# Patient Record
Sex: FEMALE | Race: WHITE | Employment: UNEMPLOYED | URBAN - METROPOLITAN AREA
[De-identification: names, ages, dates, MRNs, and addresses within clinical notes are randomized per-mention and may not be internally consistent; named-entity substitution may affect disease eponyms.]

---

## 2017-04-03 ENCOUNTER — TELEPHONE (OUTPATIENT)
Dept: OBGYN CLINIC | Age: 37
End: 2017-04-03

## 2017-04-03 NOTE — TELEPHONE ENCOUNTER
Patient calling stating that she was a previous patient of  and was prescribed 20 mg Lexapro and notices that she is having mood changes and feels like post partum depression and would like to have her rx increased. Patient advised that she needs to make an appointment to have it evaluated and she also needs to register with our office as a patient of our practice. Patient registered and scheduled appointment or 04/06/17 and will be requesting records to be transferred. Patient given address of [facility] office and advised to arrive 15 min early to complete paperwork and bring ID's, insurance card and any co-pays. Patient appreciative of the help.

## 2017-04-06 ENCOUNTER — OFFICE VISIT (OUTPATIENT)
Dept: OBGYN CLINIC | Age: 37
End: 2017-04-06

## 2017-04-06 VITALS — SYSTOLIC BLOOD PRESSURE: 116 MMHG | BODY MASS INDEX: 22.32 KG/M2 | DIASTOLIC BLOOD PRESSURE: 72 MMHG | WEIGHT: 160 LBS

## 2017-04-06 DIAGNOSIS — F41.9 ANXIETY: Primary | ICD-10-CM

## 2017-04-06 DIAGNOSIS — N94.3 PMS (PREMENSTRUAL SYNDROME): ICD-10-CM

## 2017-04-06 RX ORDER — NORETHINDRONE ACETATE AND ETHINYL ESTRADIOL 1MG-20(21)
1 KIT ORAL DAILY
Qty: 1 DOSE PACK | Refills: 11 | Status: SHIPPED | OUTPATIENT
Start: 2017-04-06 | End: 2020-10-30

## 2017-04-06 NOTE — PROGRESS NOTES
Depression note    Manjeet South is a 29 y.o. female and presents with Medication Evaluation  . Patient is here with complaints of depression    Her symptoms include depressed mood and anxiety. She complains of anxiety , currently on Lexapro. 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 This patient feels that she is able to care for herself. Risk factors for depression: she has always had underlying anxiey.     Review of Systems - History obtained from the patient-negative for:  Constitutional: weight loss, fever, night sweats  HEENT: hearing loss, earache, congestion, snoring, sorethroat  CV: chest pain, palpitations, edema  Resp: cough, shortness of breath, wheezing  Breast: breast lumps, nipple discharge, galactorrhea  GI: change in bowel habits, abdominal pain, black or bloody stools  : frequency, dysuria, hematuria, vaginal discharge  MSK: back pain, joint pain, muscle pain  Skin: itching, rash, hives  Neuro: dizziness, headache, confusion, weakness  Psych: anxiety, depression, change in mood  Heme/lymph: bleeding, bruising, pallor    Past Medical History:   Diagnosis Date    Anxiety     Psychiatric problem     anixety     Past Surgical History:   Procedure Laterality Date    HX DILATION AND CURETTAGE  12/2012    HX ORTHOPAEDIC  1999 &2001    Bunionect bilat    HX OTHER SURGICAL      wisdom teeth, age 13    HX OTHER SURGICAL      bunionectomy, 2001,2004    HX OTHER SURGICAL      d&c 2011     Social History     Social History    Marital status:      Spouse name: N/A    Number of children: N/A    Years of education: N/A     Social History Main Topics    Smoking status: Never Smoker    Smokeless tobacco: Never Used    Alcohol use 0.5 oz/week     1 Glasses of wine per week      Comment: occasional wine during pregnancy    Drug use: Yes     Special: OTC    Sexual activity: Yes     Partners: Male     Birth control/ protection: None     Other Topics Concern    Not on file     Social History Narrative     Family History   Problem Relation Age of Onset    Cancer Maternal Grandmother      breast    Cancer Maternal Grandfather      lung    Stroke Paternal Grandmother      Current Outpatient Prescriptions   Medication Sig Dispense Refill    escitalopram oxalate (LEXAPRO) 20 mg tablet   3    promethazine-dextromethorphan (PROMETHAZINE-DM) 6.25-15 mg/5 mL syrup Take 5 mL by mouth every four (4) hours as needed for Cough. 240 mL 0    predniSONE (DELTASONE) 10 mg tablet Take 1 Tab by mouth three (3) times daily (after meals). 40 Tab 1     Allergies   Allergen Reactions    Latex, Natural Rubber Hives    Demerol (Pf) [Meperidine (Pf)] Nausea and Vomiting       PHYSICAL EXAMINATION    Constitutional  · Appearance: well-nourished, well developed, alert, in no acute distress    HENT  · Head and Face: appears normal    Neck  · Inspection/Palpation: normal appearance, no masses or tenderness  · Lymph Nodes: no lymphadenopathy present  ·     Chest  · Respiratory Effort: breathing unlabored      Skin  · General Inspection: no rash, no lesions identified    Neurologic/Psychiatric  · Mental Status:  · Orientation: grossly oriented to person, place and time  · Mood and Affect: mood: appears depressed and subdued affect        Assessment:  Situational anxiety as well as probable PMS. Plan:  Trial of low dose ocp  mediatiative practices encouraged  Continue lexapro.

## 2017-04-19 NOTE — TELEPHONE ENCOUNTER
Patient calling stating that she was seen on 4/6/17 for depression and was given a low dose OCP to try and was told to continue Lexapro 20 mg and did not get a rx when at time of OV. Patient also states that since starting the OCP's, she has developed bad acne. Patient advised that starting a OCP that has estrogen can cause acne and once her body adjust, the acne should slow down and may even go away. Patient advised to continue taking her pills as directed and aware that I would send the message to [facility] for approval on Lexapro. Please advise on if ok to refill Lexapro and how many refills.

## 2017-04-24 RX ORDER — ESCITALOPRAM OXALATE 20 MG/1
20 TABLET ORAL DAILY
Qty: 90 TAB | Refills: 4 | Status: CANCELLED | OUTPATIENT
Start: 2017-04-24

## 2017-05-04 RX ORDER — ESCITALOPRAM OXALATE 20 MG/1
20 TABLET ORAL DAILY
Qty: 90 TAB | Refills: 4 | Status: SHIPPED | OUTPATIENT
Start: 2017-05-04 | End: 2018-06-12 | Stop reason: SDUPTHER

## 2018-03-10 ENCOUNTER — HOSPITAL ENCOUNTER (EMERGENCY)
Age: 38
Discharge: HOME | End: 2018-03-11
Attending: EMERGENCY MEDICINE
Payer: COMMERCIAL

## 2018-03-10 DIAGNOSIS — K52.9 GASTROENTERITIS, ACUTE: Primary | ICD-10-CM

## 2018-03-10 DIAGNOSIS — R10.9 ACUTE ABDOMINAL PAIN: ICD-10-CM

## 2018-03-10 PROCEDURE — 93005 ELECTROCARDIOGRAM TRACING: CPT

## 2018-03-10 PROCEDURE — 85025 COMPLETE CBC W/AUTO DIFF WBC: CPT | Performed by: EMERGENCY MEDICINE

## 2018-03-10 PROCEDURE — 83690 ASSAY OF LIPASE: CPT | Performed by: EMERGENCY MEDICINE

## 2018-03-10 PROCEDURE — 74011250636 HC RX REV CODE- 250/636: Performed by: EMERGENCY MEDICINE

## 2018-03-10 PROCEDURE — 82550 ASSAY OF CK (CPK): CPT | Performed by: EMERGENCY MEDICINE

## 2018-03-10 PROCEDURE — 99285 EMERGENCY DEPT VISIT HI MDM: CPT

## 2018-03-10 PROCEDURE — 36415 COLL VENOUS BLD VENIPUNCTURE: CPT | Performed by: EMERGENCY MEDICINE

## 2018-03-10 PROCEDURE — 84703 CHORIONIC GONADOTROPIN ASSAY: CPT | Performed by: EMERGENCY MEDICINE

## 2018-03-10 PROCEDURE — 80053 COMPREHEN METABOLIC PANEL: CPT | Performed by: EMERGENCY MEDICINE

## 2018-03-10 PROCEDURE — 96374 THER/PROPH/DIAG INJ IV PUSH: CPT

## 2018-03-10 PROCEDURE — 84484 ASSAY OF TROPONIN QUANT: CPT | Performed by: EMERGENCY MEDICINE

## 2018-03-10 RX ORDER — ONDANSETRON 2 MG/ML
4 INJECTION INTRAMUSCULAR; INTRAVENOUS
Status: COMPLETED | OUTPATIENT
Start: 2018-03-10 | End: 2018-03-10

## 2018-03-10 RX ADMIN — ONDANSETRON 4 MG: 2 INJECTION INTRAMUSCULAR; INTRAVENOUS at 23:52

## 2018-03-11 ENCOUNTER — APPOINTMENT (OUTPATIENT)
Dept: CT IMAGING | Age: 38
End: 2018-03-11
Attending: EMERGENCY MEDICINE
Payer: COMMERCIAL

## 2018-03-11 VITALS
SYSTOLIC BLOOD PRESSURE: 104 MMHG | HEART RATE: 78 BPM | WEIGHT: 165 LBS | DIASTOLIC BLOOD PRESSURE: 68 MMHG | BODY MASS INDEX: 23.1 KG/M2 | HEIGHT: 71 IN | OXYGEN SATURATION: 97 % | RESPIRATION RATE: 17 BRPM | TEMPERATURE: 98.2 F

## 2018-03-11 LAB
ALBUMIN SERPL-MCNC: 4.3 G/DL (ref 3.5–5)
ALBUMIN/GLOB SERPL: 1.2 {RATIO} (ref 1.1–2.2)
ALP SERPL-CCNC: 71 U/L (ref 45–117)
ALT SERPL-CCNC: 32 U/L (ref 12–78)
ANION GAP SERPL CALC-SCNC: 9 MMOL/L (ref 5–15)
APPEARANCE UR: CLEAR
AST SERPL-CCNC: 21 U/L (ref 15–37)
ATRIAL RATE: 81 BPM
BASOPHILS # BLD: 0 K/UL (ref 0–0.1)
BASOPHILS NFR BLD: 0 % (ref 0–1)
BILIRUB SERPL-MCNC: 0.5 MG/DL (ref 0.2–1)
BILIRUB UR QL: NEGATIVE
BUN SERPL-MCNC: 18 MG/DL (ref 6–20)
BUN/CREAT SERPL: 27 (ref 12–20)
CALCIUM SERPL-MCNC: 8.9 MG/DL (ref 8.5–10.1)
CALCULATED P AXIS, ECG09: 55 DEGREES
CALCULATED R AXIS, ECG10: 59 DEGREES
CALCULATED T AXIS, ECG11: 49 DEGREES
CHLORIDE SERPL-SCNC: 104 MMOL/L (ref 97–108)
CK SERPL-CCNC: 93 U/L (ref 26–192)
CO2 SERPL-SCNC: 27 MMOL/L (ref 21–32)
COLOR UR: NORMAL
CREAT SERPL-MCNC: 0.67 MG/DL (ref 0.55–1.02)
DIAGNOSIS, 93000: NORMAL
DIFFERENTIAL METHOD BLD: ABNORMAL
EOSINOPHIL # BLD: 0.3 K/UL (ref 0–0.4)
EOSINOPHIL NFR BLD: 2 % (ref 0–7)
ERYTHROCYTE [DISTWIDTH] IN BLOOD BY AUTOMATED COUNT: 12.5 % (ref 11.5–14.5)
GLOBULIN SER CALC-MCNC: 3.7 G/DL (ref 2–4)
GLUCOSE BLD STRIP.AUTO-MCNC: 141 MG/DL (ref 65–100)
GLUCOSE SERPL-MCNC: 123 MG/DL (ref 65–100)
GLUCOSE UR STRIP.AUTO-MCNC: NEGATIVE MG/DL
HCG SERPL QL: NEGATIVE
HCT VFR BLD AUTO: 38.2 % (ref 35–47)
HGB BLD-MCNC: 13.1 G/DL (ref 11.5–16)
HGB UR QL STRIP: NEGATIVE
IMM GRANULOCYTES # BLD: 0 K/UL (ref 0–0.04)
IMM GRANULOCYTES NFR BLD AUTO: 0 % (ref 0–0.5)
KETONES UR QL STRIP.AUTO: NEGATIVE MG/DL
LACTATE SERPL-SCNC: 1.3 MMOL/L (ref 0.4–2)
LEUKOCYTE ESTERASE UR QL STRIP.AUTO: NEGATIVE
LIPASE SERPL-CCNC: 118 U/L (ref 73–393)
LYMPHOCYTES # BLD: 1 K/UL (ref 0.8–3.5)
LYMPHOCYTES NFR BLD: 7 % (ref 12–49)
MCH RBC QN AUTO: 31.3 PG (ref 26–34)
MCHC RBC AUTO-ENTMCNC: 34.3 G/DL (ref 30–36.5)
MCV RBC AUTO: 91.2 FL (ref 80–99)
MONOCYTES # BLD: 0.9 K/UL (ref 0–1)
MONOCYTES NFR BLD: 7 % (ref 5–13)
NEUTS SEG # BLD: 11.6 K/UL (ref 1.8–8)
NEUTS SEG NFR BLD: 84 % (ref 32–75)
NITRITE UR QL STRIP.AUTO: NEGATIVE
NRBC # BLD: 0 K/UL (ref 0–0.01)
NRBC BLD-RTO: 0 PER 100 WBC
P-R INTERVAL, ECG05: 178 MS
PH UR STRIP: 6 [PH] (ref 5–8)
PLATELET # BLD AUTO: 242 K/UL (ref 150–400)
PMV BLD AUTO: 9.7 FL (ref 8.9–12.9)
POTASSIUM SERPL-SCNC: 3.5 MMOL/L (ref 3.5–5.1)
PROT SERPL-MCNC: 8 G/DL (ref 6.4–8.2)
PROT UR STRIP-MCNC: NEGATIVE MG/DL
Q-T INTERVAL, ECG07: 394 MS
QRS DURATION, ECG06: 90 MS
QTC CALCULATION (BEZET), ECG08: 457 MS
RBC # BLD AUTO: 4.19 M/UL (ref 3.8–5.2)
SERVICE CMNT-IMP: ABNORMAL
SODIUM SERPL-SCNC: 140 MMOL/L (ref 136–145)
SP GR UR REFRACTOMETRY: 1.02 (ref 1–1.03)
TROPONIN I SERPL-MCNC: <0.04 NG/ML
UROBILINOGEN UR QL STRIP.AUTO: 1 EU/DL (ref 0.2–1)
VENTRICULAR RATE, ECG03: 81 BPM
WBC # BLD AUTO: 13.9 K/UL (ref 3.6–11)

## 2018-03-11 PROCEDURE — 82962 GLUCOSE BLOOD TEST: CPT

## 2018-03-11 PROCEDURE — 74011250636 HC RX REV CODE- 250/636: Performed by: EMERGENCY MEDICINE

## 2018-03-11 PROCEDURE — 96375 TX/PRO/DX INJ NEW DRUG ADDON: CPT

## 2018-03-11 PROCEDURE — 74177 CT ABD & PELVIS W/CONTRAST: CPT

## 2018-03-11 PROCEDURE — 36415 COLL VENOUS BLD VENIPUNCTURE: CPT | Performed by: EMERGENCY MEDICINE

## 2018-03-11 PROCEDURE — 74011636320 HC RX REV CODE- 636/320: Performed by: RADIOLOGY

## 2018-03-11 PROCEDURE — 81003 URINALYSIS AUTO W/O SCOPE: CPT | Performed by: EMERGENCY MEDICINE

## 2018-03-11 PROCEDURE — 96361 HYDRATE IV INFUSION ADD-ON: CPT

## 2018-03-11 PROCEDURE — 83605 ASSAY OF LACTIC ACID: CPT | Performed by: EMERGENCY MEDICINE

## 2018-03-11 PROCEDURE — 74011636320 HC RX REV CODE- 636/320: Performed by: EMERGENCY MEDICINE

## 2018-03-11 PROCEDURE — 74011250636 HC RX REV CODE- 250/636: Performed by: RADIOLOGY

## 2018-03-11 PROCEDURE — 74011000250 HC RX REV CODE- 250: Performed by: EMERGENCY MEDICINE

## 2018-03-11 RX ORDER — SODIUM CHLORIDE 0.9 % (FLUSH) 0.9 %
10 SYRINGE (ML) INJECTION
Status: COMPLETED | OUTPATIENT
Start: 2018-03-11 | End: 2018-03-11

## 2018-03-11 RX ORDER — FENTANYL CITRATE 50 UG/ML
50 INJECTION, SOLUTION INTRAMUSCULAR; INTRAVENOUS
Status: COMPLETED | OUTPATIENT
Start: 2018-03-11 | End: 2018-03-11

## 2018-03-11 RX ORDER — ONDANSETRON 4 MG/1
4 TABLET, ORALLY DISINTEGRATING ORAL
Qty: 10 TAB | Refills: 0 | Status: SHIPPED | OUTPATIENT
Start: 2018-03-11 | End: 2020-10-30

## 2018-03-11 RX ORDER — SODIUM CHLORIDE 9 MG/ML
50 INJECTION, SOLUTION INTRAVENOUS
Status: COMPLETED | OUTPATIENT
Start: 2018-03-11 | End: 2018-03-11

## 2018-03-11 RX ORDER — TRAMADOL HYDROCHLORIDE 50 MG/1
50 TABLET ORAL
Qty: 20 TAB | Refills: 0 | Status: SHIPPED | OUTPATIENT
Start: 2018-03-11 | End: 2020-10-30

## 2018-03-11 RX ORDER — DICYCLOMINE HYDROCHLORIDE 20 MG/1
20 TABLET ORAL
Qty: 20 TAB | Refills: 0 | Status: SHIPPED | OUTPATIENT
Start: 2018-03-11 | End: 2020-10-30

## 2018-03-11 RX ADMIN — IOPAMIDOL 100 ML: 755 INJECTION, SOLUTION INTRAVENOUS at 06:20

## 2018-03-11 RX ADMIN — DIATRIZOATE MEGLUMINE AND DIATRIZOATE SODIUM 30 ML: 660; 100 LIQUID ORAL; RECTAL at 01:06

## 2018-03-11 RX ADMIN — SODIUM CHLORIDE 1000 ML: 900 INJECTION, SOLUTION INTRAVENOUS at 00:54

## 2018-03-11 RX ADMIN — FAMOTIDINE 20 MG: 10 INJECTION INTRAVENOUS at 00:54

## 2018-03-11 RX ADMIN — PROCHLORPERAZINE EDISYLATE 10 MG: 5 INJECTION INTRAMUSCULAR; INTRAVENOUS at 01:06

## 2018-03-11 RX ADMIN — FENTANYL CITRATE 50 MCG: 50 INJECTION, SOLUTION INTRAMUSCULAR; INTRAVENOUS at 01:06

## 2018-03-11 RX ADMIN — SODIUM CHLORIDE 50 ML/HR: 900 INJECTION, SOLUTION INTRAVENOUS at 06:20

## 2018-03-11 RX ADMIN — Medication 10 ML: at 06:21

## 2018-03-11 NOTE — ED PROVIDER NOTES
EMERGENCY DEPARTMENT HISTORY AND PHYSICAL EXAM      Date: 3/10/2018  Patient Name: Sharon Saleem    History of Presenting Illness     Chief Complaint   Patient presents with    Lethargy     per  onset this evening 2 hours PTA,  reports she had episode of vomiting and diarrhea PTA, pt pale in triage    Abdominal Pain     mid abdominal pain       History Provided By: Patient and Patient's     HPI: Sharon Saleem, 40 y.o. female with PMHx significant for Anxiety, presents ambulatory to the ED with cc of gradual onset and worsening abdominal pain with associated NVD 3 hours PTA. Per  pt started feeling the onset of her pain after eating dinner, he notes she went to her bed. When he went to check on her the pt was on the bathroom floor covered in vomit and diarrhea. Pt reports about 6-7 episodes of emesis and the one episode of diarrhea on the bathroom floor. Pt denies any hx of abdominal surgeries. Per  pt has \"never\" been this sick before and she has a high tolerance for pain. Pt is in bed with an emesis bag in her hands answering questions with one word answers or only nodding head. Pt denies any back pain, CP, SOB, fevers, dysuria, hematuria, urinary frequency. Social Hx: - Tobacco (-), + EtOH (occasionally), - illicit drug use (-)    PCP: Kat Galvin MD    There are no other complaints, changes, or physical findings at this time. Current Facility-Administered Medications   Medication Dose Route Frequency Provider Last Rate Last Dose    sodium chloride 0.9 % bolus infusion 1,000 mL  1,000 mL IntraVENous NOW Bro Carrillo MD         Current Outpatient Prescriptions   Medication Sig Dispense Refill    ondansetron (ZOFRAN ODT) 4 mg disintegrating tablet Take 1 Tab by mouth every eight (8) hours as needed for Nausea. 10 Tab 0    traMADol (ULTRAM) 50 mg tablet Take 1 Tab by mouth every six (6) hours as needed for Pain.  Max Daily Amount: 200 mg. 20 Tab 0    dicyclomine (BENTYL) 20 mg tablet Take 1 Tab by mouth every six (6) hours as needed (abdominal cramps) for up to 20 doses. 20 Tab 0    escitalopram oxalate (LEXAPRO) 20 mg tablet Take 1 Tab by mouth daily. 90 Tab 4    norethindrone-ethinyl estradiol (JUNEL FE 1/20) 1 mg-20 mcg (21)/75 mg (7) tab Take 1 Tab by mouth daily. 1 Dose Pack 11       Past History     Past Medical History:  Past Medical History:   Diagnosis Date    Anxiety     Psychiatric problem     anixety       Past Surgical History:  Past Surgical History:   Procedure Laterality Date    HX DILATION AND CURETTAGE  12/2012    HX ORTHOPAEDIC  1999 &2001    Bunionect bilat    HX OTHER SURGICAL      wisdom teeth, age 13    HX OTHER SURGICAL      bunionectomy, 2001,2004    HX OTHER SURGICAL      d&c 2011       Family History:  Family History   Problem Relation Age of Onset    Cancer Maternal Grandmother      breast    Cancer Maternal Grandfather      lung    Stroke Paternal Grandmother        Social History:  Social History   Substance Use Topics    Smoking status: Never Smoker    Smokeless tobacco: Never Used    Alcohol use 0.5 oz/week     1 Glasses of wine per week      Comment: occasional wine during pregnancy       Allergies: Allergies   Allergen Reactions    Latex, Natural Rubber Hives    Demerol (Pf) [Meperidine (Pf)] Nausea and Vomiting         Review of Systems   Review of Systems   Constitutional: Negative for fever. HENT: Negative for congestion. Eyes: Negative. Respiratory: Negative for shortness of breath. Cardiovascular: Negative for chest pain. Gastrointestinal: Positive for abdominal pain, diarrhea, nausea and vomiting. Endocrine: Negative for heat intolerance. Genitourinary: Negative for dysuria. Musculoskeletal: Negative for back pain. Skin: Negative for rash. Allergic/Immunologic: Negative for immunocompromised state. Neurological: Negative for headaches.    Hematological: Does not bruise/bleed easily. Psychiatric/Behavioral: Negative. All other systems reviewed and are negative. Physical Exam   Physical Exam   Constitutional: She is oriented to person, place, and time. She appears well-developed and well-nourished. She appears distressed (moderate). HENT:   Head: Normocephalic and atraumatic. Eyes: EOM are normal. Pupils are equal, round, and reactive to light. Neck: Normal range of motion. Neck supple. Cardiovascular: Normal rate, regular rhythm and normal heart sounds. Pulmonary/Chest: Effort normal and breath sounds normal. No respiratory distress. Abdominal: Soft. Bowel sounds are normal. She exhibits no mass. There is generalized tenderness (mild). There is no rebound and no guarding. Musculoskeletal: Normal range of motion. She exhibits no edema. Neurological: She is alert and oriented to person, place, and time. Coordination normal.   Skin: Skin is warm and dry. Psychiatric: She has a normal mood and affect. Her behavior is normal.   Nursing note and vitals reviewed. Diagnostic Study Results     Labs -     Recent Results (from the past 12 hour(s))   EKG, 12 LEAD, INITIAL    Collection Time: 03/10/18 11:35 PM   Result Value Ref Range    Ventricular Rate 81 BPM    Atrial Rate 81 BPM    P-R Interval 178 ms    QRS Duration 90 ms    Q-T Interval 394 ms    QTC Calculation (Bezet) 457 ms    Calculated P Axis 55 degrees    Calculated R Axis 59 degrees    Calculated T Axis 49 degrees    Diagnosis       Normal sinus rhythm  Normal ECG  When compared with ECG of 31-JUL-2015 20:28,  Vent.  rate has increased BY  29 BPM  QT has lengthened     CBC WITH AUTOMATED DIFF    Collection Time: 03/10/18 11:50 PM   Result Value Ref Range    WBC 13.9 (H) 3.6 - 11.0 K/uL    RBC 4.19 3.80 - 5.20 M/uL    HGB 13.1 11.5 - 16.0 g/dL    HCT 38.2 35.0 - 47.0 %    MCV 91.2 80.0 - 99.0 FL    MCH 31.3 26.0 - 34.0 PG    MCHC 34.3 30.0 - 36.5 g/dL    RDW 12.5 11.5 - 14.5 %    PLATELET 447 065 - 367 K/uL    MPV 9.7 8.9 - 12.9 FL    NRBC 0.0 0  WBC    ABSOLUTE NRBC 0.00 0.00 - 0.01 K/uL    NEUTROPHILS 84 (H) 32 - 75 %    LYMPHOCYTES 7 (L) 12 - 49 %    MONOCYTES 7 5 - 13 %    EOSINOPHILS 2 0 - 7 %    BASOPHILS 0 0 - 1 %    IMMATURE GRANULOCYTES 0 0.0 - 0.5 %    ABS. NEUTROPHILS 11.6 (H) 1.8 - 8.0 K/UL    ABS. LYMPHOCYTES 1.0 0.8 - 3.5 K/UL    ABS. MONOCYTES 0.9 0.0 - 1.0 K/UL    ABS. EOSINOPHILS 0.3 0.0 - 0.4 K/UL    ABS. BASOPHILS 0.0 0.0 - 0.1 K/UL    ABS. IMM. GRANS. 0.0 0.00 - 0.04 K/UL    DF AUTOMATED     METABOLIC PANEL, COMPREHENSIVE    Collection Time: 03/10/18 11:50 PM   Result Value Ref Range    Sodium 140 136 - 145 mmol/L    Potassium 3.5 3.5 - 5.1 mmol/L    Chloride 104 97 - 108 mmol/L    CO2 27 21 - 32 mmol/L    Anion gap 9 5 - 15 mmol/L    Glucose 123 (H) 65 - 100 mg/dL    BUN 18 6 - 20 MG/DL    Creatinine 0.67 0.55 - 1.02 MG/DL    BUN/Creatinine ratio 27 (H) 12 - 20      GFR est AA >60 >60 ml/min/1.73m2    GFR est non-AA >60 >60 ml/min/1.73m2    Calcium 8.9 8.5 - 10.1 MG/DL    Bilirubin, total 0.5 0.2 - 1.0 MG/DL    ALT (SGPT) 32 12 - 78 U/L    AST (SGOT) 21 15 - 37 U/L    Alk.  phosphatase 71 45 - 117 U/L    Protein, total 8.0 6.4 - 8.2 g/dL    Albumin 4.3 3.5 - 5.0 g/dL    Globulin 3.7 2.0 - 4.0 g/dL    A-G Ratio 1.2 1.1 - 2.2     CK    Collection Time: 03/10/18 11:50 PM   Result Value Ref Range    CK 93 26 - 192 U/L   HCG QL SERUM    Collection Time: 03/10/18 11:50 PM   Result Value Ref Range    HCG, Ql. NEGATIVE  NEG     LIPASE    Collection Time: 03/10/18 11:50 PM   Result Value Ref Range    Lipase 118 73 - 393 U/L   TROPONIN I    Collection Time: 03/10/18 11:50 PM   Result Value Ref Range    Troponin-I, Qt. <0.04 <0.05 ng/mL   GLUCOSE, POC    Collection Time: 03/11/18 12:03 AM   Result Value Ref Range    Glucose (POC) 141 (H) 65 - 100 mg/dL    Performed by Michelle Mabry    LACTIC ACID    Collection Time: 03/11/18  1:00 AM   Result Value Ref Range    Lactic acid 1.3 0.4 - 2.0 MMOL/L   URINALYSIS W/ RFLX MICROSCOPIC    Collection Time: 03/11/18  1:00 AM   Result Value Ref Range    Color YELLOW/STRAW      Appearance CLEAR CLEAR      Specific gravity 1.021 1.003 - 1.030      pH (UA) 6.0 5.0 - 8.0      Protein NEGATIVE  NEG mg/dL    Glucose NEGATIVE  NEG mg/dL    Ketone NEGATIVE  NEG mg/dL    Bilirubin NEGATIVE  NEG      Blood NEGATIVE  NEG      Urobilinogen 1.0 0.2 - 1.0 EU/dL    Nitrites NEGATIVE  NEG      Leukocyte Esterase NEGATIVE  NEG         Radiologic Studies -   CT Results  (Last 48 hours)               03/11/18 0415  CT ABD PELV W CONT Final result    Impression:  IMPRESSION:   No acute findings. Narrative:  EXAM:  CT ABD PELV W CONT       INDICATION: Abdominal pain  vomiting, diarrhea, pain above navel       COMPARISON: None       CONTRAST:  100 mL of Isovue-370. TECHNIQUE:    Following the uneventful intravenous administration of contrast, thin axial   images were obtained through the abdomen and pelvis. Coronal and sagittal   reconstructions were generated. Oral contrast was not administered. CT dose   reduction was achieved through use of a standardized protocol tailored for this   examination and automatic exposure control for dose modulation. FINDINGS:    LUNG BASES: Clear. INCIDENTALLY IMAGED HEART AND MEDIASTINUM: Unremarkable. LIVER: No mass or biliary dilatation. GALLBLADDER: Unremarkable. SPLEEN: No mass. PANCREAS: No mass or ductal dilatation. ADRENALS: Unremarkable. KIDNEYS: Left renal cysts. STOMACH: Unremarkable. SMALL BOWEL: No dilatation or wall thickening. COLON: No dilatation or wall thickening. APPENDIX: Unremarkable. PERITONEUM: No ascites or pneumoperitoneum. RETROPERITONEUM: No lymphadenopathy or aortic aneurysm. REPRODUCTIVE ORGANS: IUD in place. URINARY BLADDER: No mass or calculus. BONES: No destructive bone lesion.    ADDITIONAL COMMENTS: N/A                 Medical Decision Making   I am the first provider for this patient. I reviewed the vital signs, available nursing notes, past medical history, past surgical history, family history and social history. Vital Signs-Reviewed the patient's vital signs. Patient Vitals for the past 12 hrs:   Temp Pulse Resp BP SpO2   03/11/18 0430 - 78 17 104/68 97 %   03/11/18 0400 - 88 23 101/65 98 %   03/11/18 0330 - 72 17 103/64 99 %   03/11/18 0315 - 71 15 103/64 100 %   03/11/18 0145 - 69 14 101/60 99 %   03/11/18 0130 - 68 12 100/64 99 %   03/11/18 0115 - 75 10 98/63 98 %   03/11/18 0100 - 73 19 99/68 99 %   03/10/18 2330 98.2 °F (36.8 °C) 80 18 104/63 100 %     EKG interpretation: (Preliminary)  23:35  Rhythm: normal sinus rhythm; and regular . Rate (approx.): 81; Axis: normal; MD interval: normal; QRS interval: normal ; ST/T wave: normal; Other findings: normal.  Ramon Davis MD    Records Reviewed: Nursing Notes and Old Medical Records    Provider Notes (Medical Decision Making):   DDx: Bowel obstruction, appendicitis, cholecystitis, diverticultits    ED Course:   Initial assessment performed. The patients presenting problems have been discussed, and they are in agreement with the care plan formulated and outlined with them. I have encouraged them to ask questions as they arise throughout their visit. PROGRESS NOTE:  1:27 AM  Pt reports her pain is better after the medication but her nausea has worsened. Will also order fluids since her pressure dropped after the fentanyl. PROGRESS NOTE:  3:57 AM  Pt's pain is a 0.5/10 and she is resting comfortably in bed. Critical Care Time:   None. Disposition:  DISCHARGE NOTE  5:03 AM  The patient has been re-evaluated and is ready for discharge. Reviewed available results with patient. Counseled pt on diagnosis and care plan. Pt has expressed understanding, and all questions have been answered. Pt agrees with plan and agrees to F/U as recommended, or return to the ED if their sxs worsen.  Discharge instructions have been provided and explained to the pt, along with reasons to return to the ED. PLAN:  1. Current Discharge Medication List      START taking these medications    Details   ondansetron (ZOFRAN ODT) 4 mg disintegrating tablet Take 1 Tab by mouth every eight (8) hours as needed for Nausea. Qty: 10 Tab, Refills: 0      traMADol (ULTRAM) 50 mg tablet Take 1 Tab by mouth every six (6) hours as needed for Pain. Max Daily Amount: 200 mg. Qty: 20 Tab, Refills: 0    Associated Diagnoses: Acute abdominal pain      dicyclomine (BENTYL) 20 mg tablet Take 1 Tab by mouth every six (6) hours as needed (abdominal cramps) for up to 20 doses. Qty: 20 Tab, Refills: 0           2. Follow-up Information     Follow up With Details Comments Contact Info    TABATHA Cooper MD In 2 days As needed 13 [facility] [id number]      [facility] EMERGENCY DEPT  If symptoms worsen [address] [place] [address]  [phone]        Return to ED if worse     Diagnosis     Clinical Impression:   1. Gastroenteritis, acute    2. Acute abdominal pain        Attestations: This note is prepared by Ricci Merlin acting as Scribe for MD Uma Gibson MD : The scribe's documentation has been prepared under my direction and personally reviewed by me in its entirety. I confirm that the note above accurately reflects all work, treatment, procedures, and medical decision making performed by me.

## 2018-03-11 NOTE — ED NOTES
All discharge paperwork reviewed with patient,  and MD and they deny any need for further explanation regarding these instructions.   Denies need for wheelchair and ambulates out of ED

## 2018-03-11 NOTE — ED NOTES
Patient presents to the ED driven by her  with c/o \"I think I have the flu\". Symptoms started approx 3 hours ago with vomiting approx 5 times, I episode of diarrhea, and abdomina, pains. Denies fevers, Denies urinary symprtoms. She is lethargic but A&O x4,able to answer all questions.

## 2018-03-11 NOTE — DISCHARGE INSTRUCTIONS
Abdominal Pain: Care Instructions  Your Care Instructions    Abdominal pain has many possible causes. Some aren't serious and get better on their own in a few days. Others need more testing and treatment. If your pain continues or gets worse, you need to be rechecked and may need more tests to find out what is wrong. You may need surgery to correct the problem. Don't ignore new symptoms, such as fever, nausea and vomiting, urination problems, pain that gets worse, and dizziness. These may be signs of a more serious problem. Your doctor may have recommended a follow-up visit in the next 8 to 12 hours. If you are not getting better, you may need more tests or treatment. The doctor has checked you carefully, but problems can develop later. If you notice any problems or new symptoms, get medical treatment right away. Follow-up care is a key part of your treatment and safety. Be sure to make and go to all appointments, and call your doctor if you are having problems. It's also a good idea to know your test results and keep a list of the medicines you take. How can you care for yourself at home? · Rest until you feel better. · To prevent dehydration, drink plenty of fluids, enough so that your urine is light yellow or clear like water. Choose water and other caffeine-free clear liquids until you feel better. If you have kidney, heart, or liver disease and have to limit fluids, talk with your doctor before you increase the amount of fluids you drink. · If your stomach is upset, eat mild foods, such as rice, dry toast or crackers, bananas, and applesauce. Try eating several small meals instead of two or three large ones. · Wait until 48 hours after all symptoms have gone away before you have spicy foods, alcohol, and drinks that contain caffeine. · Do not eat foods that are high in fat. · Avoid anti-inflammatory medicines such as aspirin, ibuprofen (Advil, Motrin), and naproxen (Aleve).  These can cause stomach upset. Talk to your doctor if you take daily aspirin for another health problem. When should you call for help? Call 911 anytime you think you may need emergency care. For example, call if:  ? · You passed out (lost consciousness). ? · You pass maroon or very bloody stools. ? · You vomit blood or what looks like coffee grounds. ? · You have new, severe belly pain. ?Call your doctor now or seek immediate medical care if:  ? · Your pain gets worse, especially if it becomes focused in one area of your belly. ? · You have a new or higher fever. ? · Your stools are black and look like tar, or they have streaks of blood. ? · You have unexpected vaginal bleeding. ? · You have symptoms of a urinary tract infection. These may include:  ¨ Pain when you urinate. ¨ Urinating more often than usual.  ¨ Blood in your urine. ? · You are dizzy or lightheaded, or you feel like you may faint. ? Watch closely for changes in your health, and be sure to contact your doctor if:  ? · You are not getting better after 1 day (24 hours). Where can you learn more? Go to http://jeffOberon Mediajeimy.info/. Enter N149 in the search box to learn more about \"Abdominal Pain: Care Instructions. \"  Current as of: March 20, 2017  Content Version: 11.4  © 1306-4392 [facility]. Care instructions adapted under license by [employer] (which disclaims liability or warranty for this information). If you have questions about a medical condition or this instruction, always ask your healthcare professional. [place] [address] any warranty or liability for your use of this information. Gastroenteritis: Care Instructions  Your Care Instructions    Gastroenteritis is an illness that may cause nausea, vomiting, and diarrhea. It is sometimes called \"stomach flu. \" It can be caused by bacteria or a virus. You will probably begin to feel better in 1 to 2 days.  In the meantime, get plenty of rest and make sure you do not become dehydrated. Dehydration occurs when your body loses too much fluid. Follow-up care is a key part of your treatment and safety. Be sure to make and go to all appointments, and call your doctor if you are having problems. It's also a good idea to know your test results and keep a list of the medicines you take. How can you care for yourself at home? · If your doctor prescribed antibiotics, take them as directed. Do not stop taking them just because you feel better. You need to take the full course of antibiotics. · Drink plenty of fluids to prevent dehydration, enough so that your urine is light yellow or clear like water. Choose water and other caffeine-free clear liquids until you feel better. If you have kidney, heart, or liver disease and have to limit fluids, talk with your doctor before you increase your fluid intake. · Drink fluids slowly, in frequent, small amounts, because drinking too much too fast can cause vomiting. · Begin eating mild foods, such as dry toast, yogurt, applesauce, bananas, and rice. Avoid spicy, hot, or high-fat foods, and do not drink alcohol or caffeine for a day or two. Do not drink milk or eat ice cream until you are feeling better. How to prevent gastroenteritis  · Keep hot foods hot and cold foods cold. · Do not eat meats, dressings, salads, or other foods that have been kept at room temperature for more than 2 hours. · Use a thermometer to check your refrigerator. It should be between 34°F and 40°F.  · Defrost meats in the refrigerator or microwave, not on the kitchen counter. · Keep your hands and your kitchen clean. Wash your hands, cutting boards, and countertops with hot soapy water frequently. · Cook meat until it is well done. · Do not eat raw eggs or uncooked sauces made with raw eggs. · Do not take chances. If food looks or tastes spoiled, throw it out. When should you call for help?   Call 911 anytime you think you may need emergency care. For example, call if:  ? · You vomit blood or what looks like coffee grounds. ? · You passed out (lost consciousness). ? · You pass maroon or very bloody stools. ?Call your doctor now or seek immediate medical care if:  ? · You have severe belly pain. ? · You have signs of needing more fluids. You have sunken eyes, a dry mouth, and pass only a little dark urine. ? · You feel like you are going to faint. ? · You have increased belly pain that does not go away in 1 to 2 days. ? · You have new or increased nausea, or you are vomiting. ? · You have a new or higher fever. ? · Your stools are black and tarlike or have streaks of blood. ? Watch closely for changes in your health, and be sure to contact your doctor if:  ? · You are dizzy or lightheaded. ? · You urinate less than usual, or your urine is dark yellow or brown. ? · You do not feel better with each day that goes by. Where can you learn more? Go to http://jeff-jeimy.info/. Enter N142 in the search box to learn more about \"Gastroenteritis: Care Instructions. \"  Current as of: March 3, 2017  Content Version: 11.4  © 4493-4196 [employer]. Care instructions adapted under license by [employer] (which disclaims liability or warranty for this information). If you have questions about a medical condition or this instruction, always ask your healthcare professional. [place] [address] any warranty or liability for your use of this information. Thank you! Thank you for allowing us to provide you with excellent care today. We hope we addressed all of your concerns and needs. We strive to provide excellent quality care in the Emergency Department. You will receive a survey after your visit to evaluate the care you were provided. Please rate us a level 5 (excellent), as anything less than excellent does not meet our goals.      If you feel that you have not received excellent quality care or timely care, please ask to speak to the nurse manager. Please choose us in the future for your continued health care needs. ------------------------------------------------------------------------------------------------------------  The exam and treatment you received in the Emergency Department were for an urgent problem and are not intended as complete care. It is important that you follow up with a doctor, nurse practitioner, or physician assistant for ongoing care. If your symptoms become worse or you do not improve as expected and you are unable to reach your usual health care provider, you should return to the Emergency Department. We are available 24 hours a day. Please take your discharge instructions with you when you go to your follow-up appointment. If you have any problem arranging a follow-up appointment, contact the Emergency Department immediately. If a prescription has been provided, please have it filled as soon as possible to prevent a delay in treatment. Read the entire medication instruction sheet provided to you by the pharmacy. If you have any questions or reservations about taking the medication due to side effects or interactions with other medications, please call your primary care physician or contact the ER to speak with the charge nurse. Make an appointment with your family doctor or the physician you were referred to for follow-up of this visit as instructed on your discharge paperwork, as this is mandatory follow-up. Return to the ER if you are unable to be seen or if you are unable to be seen in a timely manner. If you have any problem arranging the follow-up visit, contact the Emergency Department immediately.

## 2018-04-13 ENCOUNTER — OFFICE VISIT (OUTPATIENT)
Dept: OBGYN CLINIC | Age: 38
End: 2018-04-13

## 2018-04-13 VITALS — WEIGHT: 161 LBS | SYSTOLIC BLOOD PRESSURE: 110 MMHG | DIASTOLIC BLOOD PRESSURE: 68 MMHG | BODY MASS INDEX: 22.45 KG/M2

## 2018-04-13 DIAGNOSIS — Z01.419 ENCOUNTER FOR GYNECOLOGICAL EXAMINATION WITHOUT ABNORMAL FINDING: Primary | ICD-10-CM

## 2018-04-13 NOTE — PROGRESS NOTES
Annual exam ages 21-44    [address] is a ,  40 y.o. female [facility] No LMP recorded. Patient is not currently having periods (Reason: IUD). .    She presents for her annual checkup. She is having no significant problems. Kids are wonderful  Menstrual status:    Her periods are absent in flow due to IUD. She denies dysmenorrhea. She reports no premenstrual symptoms. Contraception:    The current method of family planning is IUD. Sexual history:     She  reports that she currently engages in sexual activity and has had male partners. She reports using the following method of birth control/protection: IUD. Coreen Tipton Medical conditions:    Since her last annual GYN exam about one year ago, she has not the following changes in her health history: none. Pap and Mammogram History:    Her most recent Pap smear was normal, obtained 1 year(s) ago. Breast Cancer History/Substance Abuse: negative    Past Medical History:   Diagnosis Date    Anxiety     Psychiatric problem     anixety     Past Surgical History:   Procedure Laterality Date    HX DILATION AND CURETTAGE  2012    HX ORTHOPAEDIC   &    Bunionect bilat    HX OTHER SURGICAL      wisdom teeth, age 13    HX OTHER SURGICAL      bunionectomy, [id number]    HX OTHER SURGICAL      d&c        Current Outpatient Prescriptions   Medication Sig Dispense Refill    escitalopram oxalate (LEXAPRO) 20 mg tablet Take 1 Tab by mouth daily. 90 Tab 4    ondansetron (ZOFRAN ODT) 4 mg disintegrating tablet Take 1 Tab by mouth every eight (8) hours as needed for Nausea. 10 Tab 0    traMADol (ULTRAM) 50 mg tablet Take 1 Tab by mouth every six (6) hours as needed for Pain. Max Daily Amount: 200 mg. 20 Tab 0    dicyclomine (BENTYL) 20 mg tablet Take 1 Tab by mouth every six (6) hours as needed (abdominal cramps) for up to 20 doses.  20 Tab 0    norethindrone-ethinyl estradiol (JUNEL FE 1/20) 1 mg-20 mcg (21)/75 mg (7) tab Take 1 Tab by mouth daily. 1 Dose Pack 11     Allergies: Latex, natural rubber and Demerol (pf) [meperidine (pf)]     Tobacco History:  reports that she has never smoked. She has never used smokeless tobacco.  Alcohol Abuse:  reports that she drinks about 0.5 oz of alcohol per week   Drug Abuse:  reports that she uses illicit drugs, including OTC.     Family Medical/Cancer History:   Family History   Problem Relation Age of Onset    Cancer Maternal Grandmother      breast    Cancer Maternal Grandfather      lung    Stroke Paternal Grandmother         Review of Systems - History obtained from the patient  Constitutional: negative for weight loss, fever, night sweats  HEENT: negative for hearing loss, earache, congestion, snoring, sorethroat  CV: negative for chest pain, palpitations, edema  Resp: negative for cough, shortness of breath, wheezing  GI: negative for change in bowel habits, abdominal pain, black or bloody stools  : negative for frequency, dysuria, hematuria, vaginal discharge  MSK: negative for back pain, joint pain, muscle pain  Breast: negative for breast lumps, nipple discharge, galactorrhea  Skin :negative for itching, rash, hives  Neuro: negative for dizziness, headache, confusion, weakness  Psych: negative for anxiety, depression, change in mood  Heme/lymph: negative for bleeding, bruising, pallor    Physical Exam    Visit Vitals    /68 (BP 1 Location: Left arm, BP Patient Position: Sitting)    Wt 161 lb (73 kg)    Breastfeeding No    BMI 22.45 kg/m2       Constitutional  · Appearance: well-nourished, well developed, alert, in no acute distress    HENT  · Head and Face: appears normal    Chest  · Respiratory Effort: breathing unlabored    Breasts  · Inspection of Breasts: breasts symmetrical, no skin changes, no discharge present, nipple appearance normal, no skin retraction present  · Palpation of Breasts and Axillae: no masses present on palpation, no breast tenderness  · Axillary Lymph Nodes: no lymphadenopathy present    Gastrointestinal  · Abdominal Examination: abdomen non-tender to palpation, normal bowel sounds, no masses present  · Liver and spleen: no hepatomegaly present, spleen not palpable  · Hernias: no hernias identified    Genitourinary  · External Genitalia: normal appearance for age, no discharge present, no tenderness present, no inflammatory lesions present, no masses present, no atrophy present  · Vagina: normal vaginal vault without central or paravaginal defects, no discharge present, no inflammatory lesions present, no masses present  · Bladder: non-tender to palpation  · Urethra: appears normal  · Cervix: normal IUD string visualized  · Uterus: normal size, shape and consistency  · Adnexa: no adnexal tenderness present, no adnexal masses present  · Perineum: perineum within normal limits, no evidence of trauma, no rashes or skin lesions present  · Anus: anus within normal limits, no hemorrhoids present  · Inguinal Lymph Nodes: no lymphadenopathy present    Skin  · General Inspection: no rash, no lesions identified    Neurologic/Psychiatric  · Mental Status:  · Orientation: grossly oriented to person, place and time  · Mood and Affect: mood normal, affect appropriate    . Assessment:  Routine gynecologic examination  Her current medical status is satisfactory with no evidence of significant gynecologic issues.     Plan:  GC/ chlamydia offered and declined  Counseled re: diet, exercise, healthy lifestyle  Return for yearly wellness visits  Gardisil counseling provided  Rec screening mammo at either 36 or 41

## 2018-04-13 NOTE — MR AVS SNAPSHOT
[address] [facility], [facility] [address] 
[phone] Patient: Jose Rafael Montoya MRN: [id number] WIA:0[birth year] Visit Information Date & Time Provider Department Dept. Phone Encounter #  
 [id number]/06/[id number]  6:43 AM Anayeli Blanco MD [address] [facility] [phone] [id number] Upcoming Health Maintenance Date Due  
 PAP AKA CERVICAL CYTOLOGY 4/1/2016 Influenza Age 5 to Adult 8/1/2017 Allergies as of 4/13/2018  Review Complete On: 4/13/2018 By: Karlo Sanchez Severity Noted Reaction Type Reactions Latex, Natural Rubber  12/15/2011    Hives Demerol (Pf) [Meperidine (Pf)]  12/15/2011    Nausea and Vomiting Current Immunizations  Reviewed on 6/2/2014 Name Date Influenza Vaccine 11/24/2015 Tdap 4/2/2013 Not reviewed this visit You Were Diagnosed With   
  
 Codes Comments Encounter for gynecological examination without abnormal finding    -  Primary ICD-10-CM: B59.112 ICD-9-CM: V72.31 Vitals BP Weight(growth percentile) Breastfeeding? BMI OB Status Smoking Status 110/68 (BP 1 Location: Left arm, BP Patient Position: Sitting) 161 lb (73 kg) No 22.45 kg/m2 IUD Never Smoker BMI and BSA Data Body Mass Index Body Surface Area  
 22.45 kg/m 2 1.91 m 2 Preferred Pharmacy Pharmacy Name Phone [facility] [phone] Your Updated Medication List  
  
   
This list is accurate as of 4/13/18  7:56 AM.  Always use your most recent med list.  
  
  
  
  
 dicyclomine 20 mg tablet Commonly known as:  BENTYL Take 1 Tab by mouth every six (6) hours as needed (abdominal cramps) for up to 20 doses. escitalopram oxalate 20 mg tablet Commonly known as:  Scott Estrin Take 1 Tab by mouth daily. norethindrone-ethinyl estradiol 1 mg-20 mcg (21)/75 mg (7) Tab Commonly known as:  Ant Solders FE 1/20 Take 1 Tab by mouth daily. ondansetron 4 mg disintegrating tablet Commonly known as:  ZOFRAN ODT Take 1 Tab by mouth every eight (8) hours as needed for Nausea. traMADol 50 mg tablet Commonly known as:  ULTRAM  
Take 1 Tab by mouth every six (6) hours as needed for Pain. Max Daily Amount: 200 mg. Patient Instructions Pelvic Exam: Care Instructions Your Care Instructions When your doctor examines all of your pelvic organs, it's called a pelvic exam. Two good reasons to have this kind of exam are to check for sexually transmitted infections (STIs) and to get a Pap test. A Pap test is also called a Pap smear. It checks for early changes that can lead to cancer of the cervix. Sometimes a pelvic exam is part of a regular checkup. In this case, you can do some things to make your test results as accurate as possible. · Try to schedule the exam when you don't have your period. · Don't use douches, tampons, or vaginal medicines, sprays, or powders for 24 hours before your exam. 
· Don't have sex for 24 hours before your exam. 
Other times, women have this kind of exam at any time of the month. This is because they have pelvic pain, bleeding, or discharge. Or they may have another pelvic problem. Before your exam, it's important to share some information with your doctor. For example, if you are a survivor of rape or sexual abuse, you can talk about any concerns you may have. Your doctor will also want to know if you are pregnant or use birth control. And he or she will want to hear about any problems, surgeries, or procedures you have had in your pelvic area. You will also need to tell your doctor when your last period was. Follow-up care is a key part of your treatment and safety. Be sure to make and go to all appointments, and call your doctor if you are having problems.  It's also a good idea to know your test results and keep a list of the medicines you take. How is a pelvic exam done? · During a pelvic exam, you will: ¨ Take off your clothes below the waist. You will get a paper or cloth cover to put over the lower half of your body. Terressa Gutter on your back on an exam table. Your feet will be raised above you. Stirrups will support your feet. · The doctor will: ¨ Ask you to relax your knees. Your knees need to lean out, toward the walls. ¨ Check the opening of your vagina for sores or swelling. ¨ Gently put a tool called a speculum into your vagina. It opens the vagina a little bit. You will feel some pressure. But if you are relaxed, it will not hurt. It lets your doctor see inside the vagina. ¨ Use a small brush, spatula, or swab to get a sample of cells, if you are having a Pap test or culture. The doctor then removes the speculum. ¨ Put on gloves and put one or two fingers of one hand into your vagina. The other hand goes on your lower belly. This lets your doctor feel your pelvic organs. You will probably feel some pressure. Try to stay relaxed. ¨ Put one gloved finger into your rectum and one into your vagina, if needed. This can also help check your pelvic organs. This exam takes about 10 minutes. At the end, you will get a washcloth or tissue to clean your vaginal area. It's normal to have some discharge after this exam. You can then get dressed. Some test results may be ready right away. But results from a culture or a Pap test may take several days or a few weeks. Why should you have a pelvic exam? 
· You want to have recommended screening tests. This includes a Pap test. 
· You think you have a vaginal infection. Signs include itching, burning, or unusual discharge. · You might have been exposed to a sexually transmitted infection (STI), such as chlamydia or herpes. · You have vaginal bleeding that is not part of your normal menstrual period. · You have pain in your belly or pelvis. · You have been sexually assaulted. A pelvic exam lets your doctor collect evidence and check for STIs. · You are pregnant. · You are having trouble getting pregnant. What are the risks of a pelvic exam? 
There are no risks from a pelvic exam. 
When should you call for help? Watch closely for changes in your health, and be sure to contact your doctor if you have any problems. Where can you learn more? Go to http://jeff-jeimy.info/. Enter L154 in the search box to learn more about \"Pelvic Exam: Care Instructions. \" Current as of: October 13, 2016 Content Version: 11.4 © 8563-4463 [employer]. Care instructions adapted under license by [employer] (which disclaims liability or warranty for this information). If you have questions about a medical condition or this instruction, always ask your healthcare professional. [address] any warranty or liability for your use of this information. Introducing [facility]! Jose Rafael Colon introduces [employer] patient portal. Now you can access parts of your medical record, email your doctor's office, and request medication refills online. 1. In your internet browser, go to [email or web address]://[email or web address]. [facility]/[employer] 2. Click on the First Time User? Click Here link in the Sign In box. You will see the New Member Sign Up page. 3. Enter your [employer] Access Code exactly as it appears below. You will not need to use this code after youve completed the sign-up process. If you do not sign up before the expiration date, you must request a new code. · [employer] Access Code: [email or web address] Expires: 4/25/2018  4:51 PM 
 
4. Enter the last four digits of your Social Security Number (xxxx) and Date of Birth (mm/dd/yyyy) as indicated and click Submit. You will be taken to the next sign-up page. 5. Create a [employer] ID.  This will be your [employer] login ID and cannot be changed, so think of one that is secure and easy to remember. 6. Create a [employer] password. You can change your password at any time. 7. Enter your Password Reset Question and Answer. This can be used at a later time if you forget your password. 8. Enter your e-mail address. You will receive e-mail notification when new information is available in [address] 9. Click Sign Up. You can now view and download portions of your medical record. 10. Click the Download Summary menu link to download a portable copy of your medical information. If you have questions, please visit the Frequently Asked Questions section of the [employer] website. Remember, [employer] is NOT to be used for urgent needs. For medical emergencies, dial 911. Now available from your iPhone and Android! Please provide this summary of care documentation to your next provider. Your primary care clinician is listed as TABATHA Hays. If you have any questions after today's visit, please call [phone].

## 2018-04-13 NOTE — PATIENT INSTRUCTIONS
Pelvic Exam: Care Instructions  Your Care Instructions    When your doctor examines all of your pelvic organs, it's called a pelvic exam. Two good reasons to have this kind of exam are to check for sexually transmitted infections (STIs) and to get a Pap test. A Pap test is also called a Pap smear. It checks for early changes that can lead to cancer of the cervix. Sometimes a pelvic exam is part of a regular checkup. In this case, you can do some things to make your test results as accurate as possible. · Try to schedule the exam when you don't have your period. · Don't use douches, tampons, or vaginal medicines, sprays, or powders for 24 hours before your exam.  · Don't have sex for 24 hours before your exam.  Other times, women have this kind of exam at any time of the month. This is because they have pelvic pain, bleeding, or discharge. Or they may have another pelvic problem. Before your exam, it's important to share some information with your doctor. For example, if you are a survivor of rape or sexual abuse, you can talk about any concerns you may have. Your doctor will also want to know if you are pregnant or use birth control. And he or she will want to hear about any problems, surgeries, or procedures you have had in your pelvic area. You will also need to tell your doctor when your last period was. Follow-up care is a key part of your treatment and safety. Be sure to make and go to all appointments, and call your doctor if you are having problems. It's also a good idea to know your test results and keep a list of the medicines you take. How is a pelvic exam done? · During a pelvic exam, you will:  ¨ Take off your clothes below the waist. You will get a paper or cloth cover to put over the lower half of your body. Marisol Jain on your back on an exam table. Your feet will be raised above you. Stirrups will support your feet. · The doctor will:  Tobin Castellon you to relax your knees.  Your knees need to lean out, toward the walls. ¨ Check the opening of your vagina for sores or swelling. ¨ Gently put a tool called a speculum into your vagina. It opens the vagina a little bit. You will feel some pressure. But if you are relaxed, it will not hurt. It lets your doctor see inside the vagina. ¨ Use a small brush, spatula, or swab to get a sample of cells, if you are having a Pap test or culture. The doctor then removes the speculum. ¨ Put on gloves and put one or two fingers of one hand into your vagina. The other hand goes on your lower belly. This lets your doctor feel your pelvic organs. You will probably feel some pressure. Try to stay relaxed. ¨ Put one gloved finger into your rectum and one into your vagina, if needed. This can also help check your pelvic organs. This exam takes about 10 minutes. At the end, you will get a washcloth or tissue to clean your vaginal area. It's normal to have some discharge after this exam. You can then get dressed. Some test results may be ready right away. But results from a culture or a Pap test may take several days or a few weeks. Why should you have a pelvic exam?  · You want to have recommended screening tests. This includes a Pap test.  · You think you have a vaginal infection. Signs include itching, burning, or unusual discharge. · You might have been exposed to a sexually transmitted infection (STI), such as chlamydia or herpes. · You have vaginal bleeding that is not part of your normal menstrual period. · You have pain in your belly or pelvis. · You have been sexually assaulted. A pelvic exam lets your doctor collect evidence and check for STIs. · You are pregnant. · You are having trouble getting pregnant. What are the risks of a pelvic exam?  There are no risks from a pelvic exam.  When should you call for help? Watch closely for changes in your health, and be sure to contact your doctor if you have any problems. Where can you learn more?   Go to http://jeff-jeimy.info/. Enter Z641 in the search box to learn more about \"Pelvic Exam: Care Instructions. \"  Current as of: October 13, 2016  Content Version: 11.4  © 1804-0235 Healthwise, [employer]. Care instructions adapted under license by [employer] (which disclaims liability or warranty for this information). If you have questions about a medical condition or this instruction, always ask your healthcare professional. [place] [address] any warranty or liability for your use of this information.

## 2018-04-16 LAB
CYTOLOGIST CVX/VAG CYTO: NORMAL
CYTOLOGY CVX/VAG DOC THIN PREP: NORMAL
DX ICD CODE: NORMAL
LABCORP, 190119: NORMAL
Lab: NORMAL
OTHER STN SPEC: NORMAL
PATH REPORT.FINAL DX SPEC: NORMAL
STAT OF ADQ CVX/VAG CYTO-IMP: NORMAL

## 2018-06-11 ENCOUNTER — TELEPHONE (OUTPATIENT)
Dept: OBGYN CLINIC | Age: 38
End: 2018-06-11

## 2018-06-11 DIAGNOSIS — Z12.39 SCREENING FOR BREAST CANCER: Primary | ICD-10-CM

## 2018-06-11 NOTE — TELEPHONE ENCOUNTER
Patient calling stating that she had her AE on 4/13/2018 and has not ran out of her Lexapro 20mg. She is requesting a refill for the year. She is also calling about a screening mammogram.  Patient's MGM had breast cancer and she needed to know if she can have an order. Patient also encouraged to contact her insurance to make sure it is covered.

## 2018-06-12 RX ORDER — ESCITALOPRAM OXALATE 20 MG/1
20 TABLET ORAL DAILY
Qty: 90 TAB | Refills: 4 | Status: CANCELLED | OUTPATIENT
Start: 2018-06-12

## 2018-06-12 RX ORDER — ESCITALOPRAM OXALATE 20 MG/1
20 TABLET ORAL DAILY
Qty: 90 TAB | Refills: 4 | Status: SHIPPED | OUTPATIENT
Start: 2018-06-12 | End: 2019-08-23 | Stop reason: SDUPTHER

## 2018-06-12 NOTE — TELEPHONE ENCOUNTER
Amie Carlos LPN        Caller: Unspecified Skye Portillo,  3:54 PM)                     Per Dr Katina Jenkins. Patient is fine to have a refill on the Lexapro and she can have an order for the baseline mammogram. Patient should check with her insurance to see if its covered.

## 2018-10-31 ENCOUNTER — OFFICE VISIT (OUTPATIENT)
Dept: OBGYN CLINIC | Age: 38
End: 2018-10-31

## 2018-10-31 VITALS — BODY MASS INDEX: 22.59 KG/M2 | DIASTOLIC BLOOD PRESSURE: 70 MMHG | SYSTOLIC BLOOD PRESSURE: 112 MMHG | WEIGHT: 162 LBS

## 2018-10-31 DIAGNOSIS — Z30.432 ENCOUNTER FOR IUD REMOVAL: Primary | ICD-10-CM

## 2018-10-31 NOTE — PROGRESS NOTES
IUD followup note    This is a follow-up visit for Richie Casper is a [phone],  41 y.o. female [facility] No LMP recorded. Patient is not currently having periods (Reason: IUD). .   now has vasectomy    She had an Mirena IUD placed 4 years ago. She is here today to discuss removal vs continued use of IUD. Risks and benefits reviewed. Overall, she is extremely happy w IUD and has no discomfort or bleeding          Past Medical History:   Diagnosis Date    Anxiety     Encounter for IUD removal     Psychiatric problem     anixety     Past Surgical History:   Procedure Laterality Date    HX DILATION AND CURETTAGE  12/2012    HX ORTHOPAEDIC  1999 &2001    Bunionect bilat    HX OTHER SURGICAL      wisdom teeth, age 13    HX OTHER SURGICAL      bunionectomy, 2001,2004    HX OTHER SURGICAL      d&c 2011     Social History     Occupational History    Not on file   Tobacco Use    Smoking status: Never Smoker    Smokeless tobacco: Never Used   Substance and Sexual Activity    Alcohol use: Yes     Alcohol/week: 0.5 oz     Types: 1 Glasses of wine per week     Comment: occasional wine during pregnancy    Drug use: Yes     Types: OTC    Sexual activity: Yes     Partners: Male     Birth control/protection: Surgical     Comment: vasectomy     Family History   Problem Relation Age of Onset    Cancer Maternal Grandmother         breast    Cancer Maternal Grandfather         lung    Stroke Paternal Grandmother        Allergies   Allergen Reactions    Latex, Natural Rubber Hives    Demerol (Pf) [Meperidine (Pf)] Nausea and Vomiting     Prior to Admission medications    Medication Sig Start Date End Date Taking? Authorizing Provider   escitalopram oxalate (LEXAPRO) 20 mg tablet Take 1 Tab by mouth daily. 6/12/18   Terrance Otero MD   ondansetron (ZOFRAN ODT) 4 mg disintegrating tablet Take 1 Tab by mouth every eight (8) hours as needed for Nausea.  3/11/18   Ave Lynch MD   traMADol (ULTRAM) 50 mg tablet Take 1 Tab by mouth every six (6) hours as needed for Pain. Max Daily Amount: 200 mg. 3/11/18   Roberto Medina MD   dicyclomine (BENTYL) 20 mg tablet Take 1 Tab by mouth every six (6) hours as needed (abdominal cramps) for up to 20 doses. 3/11/18   Roberto Medina MD   norethindrone-ethinyl estradiol (JUNEL FE 1/20) 1 mg-20 mcg (21)/75 mg (7) tab Take 1 Tab by mouth daily.  2/7/86   Misa Butt MD        Review of Systems: History obtained from the patient  Constitutional: negative for weight loss, fever, night sweats  Breast: negative for breast lumps, nipple discharge, galactorrhea  GI: negative for change in bowel habits, abdominal pain, black or bloody stools  : negative for frequency, dysuria, hematuria, vaginal discharge  MSK: negative for back pain, joint pain, muscle pain  Skin: negative for itching, rash, hives  Psych: negative for anxiety, depression, change in mood      Objective:  Visit Vitals  /70 (BP 1 Location: Left arm, BP Patient Position: Sitting)   Wt 162 lb (73.5 kg)   BMI 22.59 kg/m²       Physical Exam:   PHYSICAL EXAMINATION    Constitutional  · Appearance: well-nourished, well developed, alert, in no acute distress    Gastrointestinal  · Abdominal Examination: abdomen non-tender to palpation, normal bowel sounds, no masses present  · Liver and spleen: no hepatomegaly present, spleen not palpable  · Hernias: no hernias identified    Genitourinary  · External Genitalia: normal appearance for age, no discharge present, no tenderness present, no inflammatory lesions present, no masses present, no atrophy present  · Vagina: normal vaginal vault without central or paravaginal defects, no discharge present, no inflammatory lesions present, no masses present  · Bladder: non-tender to palpation  · Urethra: appears normal  · Cervix: normal with IUD string visible and appropriate length   · Uterus: normal size, shape and consistency  · Adnexa: no adnexal tenderness present, no adnexal masses present  · Perineum: perineum within normal limits, no evidence of trauma, no rashes or skin lesions present    Skin  · General Inspection: no rash, no lesions identified    Neurologic/Psychiatric  · Mental Status:  · Orientation: grossly oriented to person, place and time  · Mood and Affect: mood normal, affect appropriate    Assessment:  Doing well        Plan:   RTO for AE as scheduled.   At this point, pt desires continued use

## 2018-10-31 NOTE — PROGRESS NOTES
IUD REMOVAL Indications for Removal: Gab Silva is a [phone],  41 y.o. female [facility] whose No LMP recorded. Patient is not currently having periods (Reason: IUD). was on . who presents today for IUD removal. Her current IUD was placed{Time; 1 week to 1 month:36341}. She {has/has not:73675} had *** problems with the IUD. She requests removal of the IUD because ***. The IUD removal procedure was discussed with the patient and she had no further questions. Procedure: The patient was placed in a dorsal lithotomy position and appropriately draped. On bimanual exam the uterus was anterior and normal in size with no tenderness present. A speculum exam was performed and the cervix was visualized. The IUD string was visualized. Using ring forceps , the string was grasped and the IUD removed intact. The IUD was shown to the patient. Patient tolerated removal with no significant discomfort.

## 2018-10-31 NOTE — PATIENT INSTRUCTIONS
IUD Removal: Care Instructions  Your Care Instructions    The intrauterine device (IUD) is a method of birth control. It is a small, plastic, T-shaped device that contains copper or hormones. It is placed in your uterus. You may have had your IUD removed because you want to become pregnant. Or maybe it caused pain, bleeding, or an infection. You may have chosen another method of birth control. If you don't want to get pregnant, make sure to use another form of birth control now that your IUD is not in place. Talk to your doctor about other forms of birth control. Follow-up care is a key part of your treatment and safety. Be sure to make and go to all appointments, and call your doctor if you are having problems. It's also a good idea to know your test results and keep a list of the medicines you take. How can you care for yourself at home? · IUD removal does not usually cause any pain or problems if the IUD is removed because you want to become pregnant or because of bleeding. · Once the IUD is taken out, you can become pregnant. If you want to become pregnant, you can start trying to have a baby as soon as you like. · If your doctor prescribed antibiotics because of an infection, take them as directed. Do not stop taking them just because you feel better. You need to take the full course of antibiotics. When should you call for help? Call your doctor now or seek immediate medical care if:    · You have pain in your belly or pelvis.     · You have severe vaginal bleeding. This means that you are soaking through your usual pads or tampons every hour for 2 or more hours.     · You have a fever.     · You have a vaginal discharge that smells bad.    Watch closely for changes in your health, and be sure to contact your doctor if you have any problems. Where can you learn more? Go to http://jeff-jeimy.info/.   Enter J364 in the search box to learn more about \"IUD Removal: Care Instructions. \"  Current as of: November 21, 2017  Content Version: 11.8  © 2509-8710 Healthwise, [facility]. Care instructions adapted under license by [employer] (which disclaims liability or warranty for this information). If you have questions about a medical condition or this instruction, always ask your healthcare professional. [place] [address] any warranty or liability for your use of this information.

## 2019-08-23 RX ORDER — ESCITALOPRAM OXALATE 20 MG/1
TABLET ORAL
Qty: 90 TAB | Refills: 4 | Status: SHIPPED | OUTPATIENT
Start: 2019-08-23

## 2019-10-31 ENCOUNTER — HOSPITAL ENCOUNTER (OUTPATIENT)
Dept: MAMMOGRAPHY | Age: 39
Discharge: HOME | End: 2019-10-31
Attending: FAMILY MEDICINE

## 2019-10-31 DIAGNOSIS — Z12.31 VISIT FOR SCREENING MAMMOGRAM: ICD-10-CM

## 2019-10-31 DIAGNOSIS — R59.0 ENLARGED SUBMENTAL LYMPH NODE: ICD-10-CM

## 2019-10-31 DIAGNOSIS — N63.10 MASS OF BREAST, RIGHT: ICD-10-CM

## 2019-11-05 ENCOUNTER — HOSPITAL ENCOUNTER (OUTPATIENT)
Dept: MAMMOGRAPHY | Age: 39
Discharge: HOME | End: 2019-11-05
Attending: FAMILY MEDICINE
Payer: COMMERCIAL

## 2019-11-05 DIAGNOSIS — N63.10 MASS OF BREAST, RIGHT: ICD-10-CM

## 2019-11-05 DIAGNOSIS — R59.0 ENLARGED SUBMENTAL LYMPH NODE: ICD-10-CM

## 2019-11-05 PROCEDURE — 77062 BREAST TOMOSYNTHESIS BI: CPT

## 2019-11-05 PROCEDURE — 76642 ULTRASOUND BREAST LIMITED: CPT

## 2020-10-30 ENCOUNTER — OFFICE VISIT (OUTPATIENT)
Dept: OBGYN CLINIC | Age: 40
End: 2020-10-30

## 2020-10-30 VITALS
WEIGHT: 163.25 LBS | BODY MASS INDEX: 22.85 KG/M2 | HEIGHT: 71 IN | SYSTOLIC BLOOD PRESSURE: 114 MMHG | DIASTOLIC BLOOD PRESSURE: 67 MMHG

## 2020-10-30 DIAGNOSIS — Z30.432 ENCOUNTER FOR IUD REMOVAL: Primary | ICD-10-CM

## 2020-10-30 NOTE — PROGRESS NOTES
IUD REMOVAL  Indications for Removal:  Erin Merida is a ,  45 y.o. female [facility] whose No LMP recorded. (Menstrual status: IUD). was on . who presents today for IUD removal. Her current IUD was placed years ago. She has not had no problems with the IUD. She requests removal of the IUD because  got vasectomy. The IUD removal procedure was discussed with the patient and she had no further questions. Procedure: The patient was placed in a dorsal lithotomy position and appropriately draped. On bimanual exam the uterus was anterior and normal in size with no tenderness present. A speculum exam was performed and the cervix was visualized. The IUD string was visualized. Using ring forceps , the string was grasped and the IUD removed intact. The IUD was shown to the patient. Patient tolerated removal with no significant discomfort.

## 2020-10-30 NOTE — PATIENT INSTRUCTIONS
IUD Removal: Care Instructions  Your Care Instructions     The intrauterine device (IUD) is a method of birth control. It is a small, plastic, T-shaped device that contains copper or hormones. It is placed in your uterus. You may have had your IUD removed because you want to become pregnant. Or maybe it caused pain, bleeding, or an infection. You may have chosen another method of birth control. If you don't want to get pregnant, make sure to use another form of birth control now that your IUD is not in place. Talk to your doctor about other forms of birth control. Follow-up care is a key part of your treatment and safety. Be sure to make and go to all appointments, and call your doctor if you are having problems. It's also a good idea to know your test results and keep a list of the medicines you take. How can you care for yourself at home? · IUD removal does not usually cause any pain or problems if the IUD is removed because you want to become pregnant or because of bleeding. · Once the IUD is taken out, you can become pregnant. If you want to become pregnant, you can start trying to have a baby as soon as you like. · If your doctor prescribed antibiotics because of an infection, take them as directed. Do not stop taking them just because you feel better. You need to take the full course of antibiotics. When should you call for help? Call your doctor now or seek immediate medical care if:    · You have pain in your belly or pelvis.     · You have severe vaginal bleeding. This means that you are soaking through your usual pads or tampons every hour for 2 or more hours.     · You have a fever.     · You have a vaginal discharge that smells bad. Watch closely for changes in your health, and be sure to contact your doctor if you have any problems. Where can you learn more?   Go to http://www.gray.com/  Enter M729 in the search box to learn more about \"IUD Removal: Care Instructions. \"  Current as of: February 11, 2020               Content Version: 12.6  © 0817-9127 [employer]. Care instructions adapted under license by [employer] (which disclaims liability or warranty for this information). If you have questions about a medical condition or this instruction, always ask your healthcare professional. [place] [address] any warranty or liability for your use of this information.

## 2021-04-23 ENCOUNTER — TRANSCRIBE ORDER (OUTPATIENT)
Dept: SCHEDULING | Age: 41
End: 2021-04-23

## 2021-04-23 DIAGNOSIS — M54.2 NECK PAIN: Primary | ICD-10-CM

## 2021-04-28 ENCOUNTER — TRANSCRIBE ORDER (OUTPATIENT)
Dept: SCHEDULING | Age: 41
End: 2021-04-28

## 2021-04-28 DIAGNOSIS — M54.2 NECK PAIN: Primary | ICD-10-CM

## 2021-08-19 ENCOUNTER — TRANSCRIBE ORDER (OUTPATIENT)
Dept: SCHEDULING | Age: 41
End: 2021-08-19

## 2021-08-19 DIAGNOSIS — Z12.31 SCREENING MAMMOGRAM FOR HIGH-RISK PATIENT: Primary | ICD-10-CM

## 2021-09-08 ENCOUNTER — HOSPITAL ENCOUNTER (OUTPATIENT)
Dept: MAMMOGRAPHY | Age: 41
Discharge: HOME | End: 2021-09-08
Attending: FAMILY MEDICINE
Payer: COMMERCIAL

## 2021-09-08 DIAGNOSIS — Z12.31 SCREENING MAMMOGRAM FOR HIGH-RISK PATIENT: ICD-10-CM

## 2021-09-08 PROCEDURE — 77063 BREAST TOMOSYNTHESIS BI: CPT

## 2022-10-28 ENCOUNTER — TRANSCRIBE ORDER (OUTPATIENT)
Dept: SCHEDULING | Age: 42
End: 2022-10-28

## 2022-10-28 DIAGNOSIS — Z12.31 OTHER SCREENING MAMMOGRAM: Primary | ICD-10-CM

## 2022-12-20 ENCOUNTER — HOSPITAL ENCOUNTER (OUTPATIENT)
Dept: MAMMOGRAPHY | Age: 42
Discharge: HOME | End: 2022-12-20
Attending: FAMILY MEDICINE
Payer: COMMERCIAL

## 2022-12-20 DIAGNOSIS — Z12.31 OTHER SCREENING MAMMOGRAM: ICD-10-CM

## 2022-12-20 PROCEDURE — 77063 BREAST TOMOSYNTHESIS BI: CPT

## 2023-07-12 ENCOUNTER — TELEPHONE (OUTPATIENT)
Age: 43
End: 2023-07-12

## 2023-07-12 NOTE — TELEPHONE ENCOUNTER
Patient calling name and  verified. Patient scheduled her annual exam appointment for 10/05/2023 she was seeing her pcp and she had written referral for pelvic floor physical therapy she is asking if we can write referral for this her pcp has gone to [facility] and she is looking for new pcp. Patient aware you will not be back until Monday. It is for [facility] her therapist is [facility].  Weakness in pelvic floor,      Please advise thank you

## 2023-07-12 NOTE — TELEPHONE ENCOUNTER
Attempted to call patient phone  in middle of conversation.  She wants to schedule problem visit so she can have referral.

## 2023-07-14 NOTE — TELEPHONE ENCOUNTER
Patient calling back, name and  verified. patient added to schedule for appointment in august soonest I could find

## 2023-07-17 ENCOUNTER — TELEPHONE (OUTPATIENT)
Age: 43
End: 2023-07-17

## 2023-11-13 SDOH — HEALTH STABILITY: PHYSICAL HEALTH: ON AVERAGE, HOW MANY DAYS PER WEEK DO YOU ENGAGE IN MODERATE TO STRENUOUS EXERCISE (LIKE A BRISK WALK)?: 1 DAY

## 2023-11-13 SDOH — HEALTH STABILITY: PHYSICAL HEALTH: ON AVERAGE, HOW MANY MINUTES DO YOU ENGAGE IN EXERCISE AT THIS LEVEL?: 20 MIN

## 2023-11-16 ENCOUNTER — OFFICE VISIT (OUTPATIENT)
Dept: PRIMARY CARE CLINIC | Facility: CLINIC | Age: 43
End: 2023-11-16
Payer: COMMERCIAL

## 2023-11-16 VITALS
TEMPERATURE: 97.7 F | HEART RATE: 75 BPM | BODY MASS INDEX: 24.56 KG/M2 | HEIGHT: 71 IN | DIASTOLIC BLOOD PRESSURE: 70 MMHG | OXYGEN SATURATION: 100 % | RESPIRATION RATE: 17 BRPM | WEIGHT: 175.4 LBS | SYSTOLIC BLOOD PRESSURE: 107 MMHG

## 2023-11-16 DIAGNOSIS — Z13.220 SCREENING FOR HYPERLIPIDEMIA: ICD-10-CM

## 2023-11-16 DIAGNOSIS — Z13.1 SCREENING FOR DIABETES MELLITUS: ICD-10-CM

## 2023-11-16 DIAGNOSIS — R55 SYNCOPE, UNSPECIFIED SYNCOPE TYPE: ICD-10-CM

## 2023-11-16 DIAGNOSIS — N92.0 MENORRHAGIA WITH REGULAR CYCLE: ICD-10-CM

## 2023-11-16 DIAGNOSIS — Z23 NEED FOR IMMUNIZATION AGAINST INFLUENZA: ICD-10-CM

## 2023-11-16 DIAGNOSIS — F41.1 GENERALIZED ANXIETY DISORDER: ICD-10-CM

## 2023-11-16 DIAGNOSIS — Z86.2 HISTORY OF ANEMIA: ICD-10-CM

## 2023-11-16 DIAGNOSIS — Z86.2 HISTORY OF ANEMIA: Primary | ICD-10-CM

## 2023-11-16 PROCEDURE — 90674 CCIIV4 VAC NO PRSV 0.5 ML IM: CPT | Performed by: FAMILY MEDICINE

## 2023-11-16 PROCEDURE — 90471 IMMUNIZATION ADMIN: CPT | Performed by: FAMILY MEDICINE

## 2023-11-16 PROCEDURE — 99204 OFFICE O/P NEW MOD 45 MIN: CPT | Performed by: FAMILY MEDICINE

## 2023-11-16 RX ORDER — ESCITALOPRAM OXALATE 20 MG/1
20 TABLET ORAL DAILY
Qty: 90 TABLET | Refills: 1 | Status: SHIPPED | OUTPATIENT
Start: 2023-11-16

## 2023-11-16 SDOH — ECONOMIC STABILITY: HOUSING INSECURITY
IN THE LAST 12 MONTHS, WAS THERE A TIME WHEN YOU DID NOT HAVE A STEADY PLACE TO SLEEP OR SLEPT IN A SHELTER (INCLUDING NOW)?: PATIENT REFUSED

## 2023-11-16 SDOH — ECONOMIC STABILITY: FOOD INSECURITY: WITHIN THE PAST 12 MONTHS, THE FOOD YOU BOUGHT JUST DIDN'T LAST AND YOU DIDN'T HAVE MONEY TO GET MORE.: PATIENT DECLINED

## 2023-11-16 SDOH — ECONOMIC STABILITY: INCOME INSECURITY: HOW HARD IS IT FOR YOU TO PAY FOR THE VERY BASICS LIKE FOOD, HOUSING, MEDICAL CARE, AND HEATING?: PATIENT DECLINED

## 2023-11-16 SDOH — ECONOMIC STABILITY: FOOD INSECURITY: WITHIN THE PAST 12 MONTHS, YOU WORRIED THAT YOUR FOOD WOULD RUN OUT BEFORE YOU GOT MONEY TO BUY MORE.: PATIENT DECLINED

## 2023-11-16 ASSESSMENT — PATIENT HEALTH QUESTIONNAIRE - PHQ9
SUM OF ALL RESPONSES TO PHQ QUESTIONS 1-9: 2
1. LITTLE INTEREST OR PLEASURE IN DOING THINGS: 1
2. FEELING DOWN, DEPRESSED OR HOPELESS: 1
SUM OF ALL RESPONSES TO PHQ9 QUESTIONS 1 & 2: 2

## 2023-11-16 NOTE — PROGRESS NOTES
HPI     Chief Complaint   Patient presents with    [facility]     sciatica     She is a 44 y.o. female who presents to [facility]. Establishing Care    Pmhx : gen anxiety. Surghx reviewed. Fhx reviewed. Sochx reviewed. Has been stable on lexapro for quite some time. Follows with gyn. UTD on mammo and pap screenings. Walks for exercise. Diet is healthy. Alcohol intake is about 3 servings per week. Notes chronic problems with syncopal events. This has been occurring for > 5 years. No recent change in her symptoms. Seems to be preceded by vagal activities (such as having a BM). She does get presyncopal symptoms of lightheadedness and feeling that she will  pass out. No associated chest pain, dyspnea or palpitations. She does endorse chronic heavy menstrual periods. - Chronic medical problems:  Past Medical History:   Diagnosis Date    Anxiety     Encounter for IUD removal     Psychiatric problem     anixety     - Current medications:   Current Outpatient Medications   Medication Sig    escitalopram (LEXAPRO) 20 MG tablet TAKE 1 TABLET BY MOUTH DAILY     No current facility-administered medications for this visit. - Family history:   Family History   Problem Relation Age of Onset    Cancer Maternal Grandmother         breast    Breast Cancer Maternal Grandmother 48    Stroke Paternal Grandmother     Cancer Maternal Grandfather         lung     - Allergies:    Allergies   Allergen Reactions    Latex Hives    Meperidine Nausea And Vomiting     - Surgical history:   Past Surgical History:   Procedure Laterality Date    DILATION AND CURETTAGE OF UTERUS  12/2012    ORTHOPEDIC SURGERY  1999 &2001    Bunionect bilat    OTHER SURGICAL HISTORY      bunionectomy, [id number]    OTHER SURGICAL HISTORY      d&c 2011    OTHER SURGICAL HISTORY      wisdom teeth, age 13     - Social history (sexually active, occupation, smoker, etoh use, etc):   Social History     Socioeconomic History

## 2023-11-17 LAB
ALBUMIN SERPL-MCNC: 4.7 G/DL (ref 3.9–4.9)
ALBUMIN/GLOB SERPL: 1.7 {RATIO} (ref 1.2–2.2)
ALP SERPL-CCNC: 63 IU/L (ref 44–121)
ALT SERPL-CCNC: 20 IU/L (ref 0–32)
AST SERPL-CCNC: 22 IU/L (ref 0–40)
BILIRUB SERPL-MCNC: 0.6 MG/DL (ref 0–1.2)
BUN SERPL-MCNC: 16 MG/DL (ref 6–24)
BUN/CREAT SERPL: 27 (ref 9–23)
CALCIUM SERPL-MCNC: 9.3 MG/DL (ref 8.7–10.2)
CHLORIDE SERPL-SCNC: 101 MMOL/L (ref 96–106)
CHOLEST SERPL-MCNC: 195 MG/DL (ref 100–199)
CO2 SERPL-SCNC: 25 MMOL/L (ref 20–29)
CREAT SERPL-MCNC: 0.6 MG/DL (ref 0.57–1)
EGFRCR SERPLBLD CKD-EPI 2021: 115 ML/MIN/1.73
ERYTHROCYTE [DISTWIDTH] IN BLOOD BY AUTOMATED COUNT: 12.7 % (ref 11.7–15.4)
GLOBULIN SER CALC-MCNC: 2.7 G/DL (ref 1.5–4.5)
GLUCOSE SERPL-MCNC: 87 MG/DL (ref 70–99)
HBA1C MFR BLD: 5.2 % (ref 4.8–5.6)
HCT VFR BLD AUTO: 37.6 % (ref 34–46.6)
HDLC SERPL-MCNC: 82 MG/DL
HGB BLD-MCNC: 12.5 G/DL (ref 11.1–15.9)
LDLC SERPL CALC-MCNC: 101 MG/DL (ref 0–99)
MCH RBC QN AUTO: 30.6 PG (ref 26.6–33)
MCHC RBC AUTO-ENTMCNC: 33.2 G/DL (ref 31.5–35.7)
MCV RBC AUTO: 92 FL (ref 79–97)
PLATELET # BLD AUTO: 306 X10E3/UL (ref 150–450)
POTASSIUM SERPL-SCNC: 4.5 MMOL/L (ref 3.5–5.2)
PROT SERPL-MCNC: 7.4 G/DL (ref 6–8.5)
RBC # BLD AUTO: 4.08 X10E6/UL (ref 3.77–5.28)
SODIUM SERPL-SCNC: 138 MMOL/L (ref 134–144)
TRIGL SERPL-MCNC: 64 MG/DL (ref 0–149)
TSH SERPL DL<=0.005 MIU/L-ACNC: 1.01 UIU/ML (ref 0.45–4.5)
VLDLC SERPL CALC-MCNC: 12 MG/DL (ref 5–40)
WBC # BLD AUTO: 6.8 X10E3/UL (ref 3.4–10.8)

## 2023-12-04 ENCOUNTER — HOSPITAL ENCOUNTER (OUTPATIENT)
Facility: HOSPITAL | Age: 43
Discharge: HOME | End: 2023-12-06
Attending: FAMILY MEDICINE
Payer: COMMERCIAL

## 2023-12-04 VITALS — BODY MASS INDEX: 24.57 KG/M2 | HEIGHT: 71 IN | WEIGHT: 175.49 LBS

## 2023-12-04 DIAGNOSIS — R55 SYNCOPE, UNSPECIFIED SYNCOPE TYPE: ICD-10-CM

## 2023-12-04 LAB
ECHO AO ROOT DIAM: 2.7 CM
ECHO AO ROOT INDEX: 1.36 CM/M2
ECHO AV AREA PEAK VELOCITY: 3.1 CM2
ECHO AV AREA/BSA PEAK VELOCITY: 1.6 CM2/M2
ECHO AV PEAK GRADIENT: 5 MMHG
ECHO AV PEAK VELOCITY: 1.2 M/S
ECHO AV VELOCITY RATIO: 0.83
ECHO BSA: 2 M2
ECHO LA DIAMETER INDEX: 1.36 CM/M2
ECHO LA DIAMETER: 2.7 CM
ECHO LA TO AORTIC ROOT RATIO: 1
ECHO LA VOL A-L A2C: 54 ML (ref 22–52)
ECHO LA VOL A-L A4C: 36 ML (ref 22–52)
ECHO LA VOL MOD A2C: 52 ML (ref 22–52)
ECHO LA VOL MOD A4C: 32 ML (ref 22–52)
ECHO LA VOLUME AREA LENGTH: 48 ML
ECHO LA VOLUME INDEX A-L A2C: 27 ML/M2 (ref 16–34)
ECHO LA VOLUME INDEX A-L A4C: 18 ML/M2 (ref 16–34)
ECHO LA VOLUME INDEX AREA LENGTH: 24 ML/M2 (ref 16–34)
ECHO LA VOLUME INDEX MOD A2C: 26 ML/M2 (ref 16–34)
ECHO LA VOLUME INDEX MOD A4C: 16 ML/M2 (ref 16–34)
ECHO LV E' LATERAL VELOCITY: 13 CM/S
ECHO LV E' SEPTAL VELOCITY: 10 CM/S
ECHO LV FRACTIONAL SHORTENING: 37 % (ref 28–44)
ECHO LV INTERNAL DIMENSION DIASTOLE INDEX: 2.06 CM/M2
ECHO LV INTERNAL DIMENSION DIASTOLIC: 4.1 CM (ref 3.9–5.3)
ECHO LV INTERNAL DIMENSION SYSTOLIC INDEX: 1.31 CM/M2
ECHO LV INTERNAL DIMENSION SYSTOLIC: 2.6 CM
ECHO LV IVSD: 1 CM (ref 0.6–0.9)
ECHO LV MASS 2D: 132.1 G (ref 67–162)
ECHO LV MASS INDEX 2D: 66.4 G/M2 (ref 43–95)
ECHO LV POSTERIOR WALL DIASTOLIC: 1 CM (ref 0.6–0.9)
ECHO LV RELATIVE WALL THICKNESS RATIO: 0.49
ECHO LVOT AREA: 3.5 CM2
ECHO LVOT DIAM: 2.1 CM
ECHO LVOT PEAK GRADIENT: 4 MMHG
ECHO LVOT PEAK VELOCITY: 1 M/S
ECHO MV A VELOCITY: 0.58 M/S
ECHO MV E DECELERATION TIME (DT): 198.2 MS
ECHO MV E VELOCITY: 0.76 M/S
ECHO MV E/A RATIO: 1.31
ECHO MV E/E' LATERAL: 5.85
ECHO MV E/E' RATIO (AVERAGED): 6.72
ECHO RA VOLUME: 27 ML
ECHO RA VOLUME: 29 ML
ECHO RV TAPSE: 1.9 CM (ref 1.7–?)

## 2023-12-04 PROCEDURE — 93306 TTE W/DOPPLER COMPLETE: CPT

## 2024-01-24 ENCOUNTER — HOSPITAL ENCOUNTER (OUTPATIENT)
Age: 44
Discharge: HOME | End: 2024-01-27
Payer: COMMERCIAL

## 2024-01-24 VITALS — WEIGHT: 170 LBS | BODY MASS INDEX: 24.34 KG/M2 | HEIGHT: 70 IN

## 2024-01-24 DIAGNOSIS — Z12.31 SCREENING MAMMOGRAM, ENCOUNTER FOR: ICD-10-CM

## 2024-01-24 PROCEDURE — 77063 BREAST TOMOSYNTHESIS BI: CPT

## 2024-02-02 ENCOUNTER — TELEPHONE (OUTPATIENT)
Age: 44
End: 2024-02-02

## 2024-02-02 ENCOUNTER — OFFICE VISIT (OUTPATIENT)
Age: 44
End: 2024-02-02
Payer: COMMERCIAL

## 2024-02-02 DIAGNOSIS — Z01.419 ENCOUNTER FOR GYNECOLOGICAL EXAMINATION WITHOUT ABNORMAL FINDING: Primary | ICD-10-CM

## 2024-02-02 DIAGNOSIS — N92.6 IRREGULAR MENSES: ICD-10-CM

## 2024-02-02 PROCEDURE — 99396 PREV VISIT EST AGE 40-64: CPT | Performed by: OBSTETRICS & GYNECOLOGY

## 2024-02-02 NOTE — PROGRESS NOTES
Annual exam    Elva Weiss is a 43 y.o.  presenting for annual exam. She is doing well. Starting to get irregular cycles; cycles shorted and longer time between cycles.  Worsening acne  Kids 8 and 10  Ob/Gyn Hx:  G 2P 2 -   LMP - No LMP recorded.  Menses - regular; with the following concerns: negative  Contraception - vasectomy  SA - yes    Health maintenance:  Pap - Last PAP was normal; April/2018.  Mammo - Lat mammogram was done 1/2024, needs addl views  Colonoscopy - not yet done      Past Medical History:   Diagnosis Date    Anxiety     Encounter for IUD removal     Psychiatric problem     anixety       Past Surgical History:   Procedure Laterality Date    DILATION AND CURETTAGE OF UTERUS  12/2012    ORTHOPEDIC SURGERY  1999 &2001    Bunionect bilat    OTHER SURGICAL HISTORY      bunionectomy, 2001,2004    OTHER SURGICAL HISTORY      wisdom teeth, age 17       Family History   Problem Relation Age of Onset    Heart Disease Mother     Breast Cancer Maternal Grandmother 50    Cancer Maternal Grandfather         lung    Stroke Paternal Grandmother        Social History     Socioeconomic History    Marital status:      Spouse name: Not on file    Number of children: Not on file    Years of education: Not on file    Highest education level: Not on file   Occupational History    Not on file   Tobacco Use    Smoking status: Never    Smokeless tobacco: Never   Substance and Sexual Activity    Alcohol use: Yes     Alcohol/week: 3.0 standard drinks of alcohol     Types: 3 Standard drinks or equivalent per week    Drug use: Yes     Types: OTC    Sexual activity: Not on file     Comment: vasectomy   Other Topics Concern    Not on file   Social History Narrative    Not on file     Social Determinants of Health     Financial Resource Strain: Patient Declined (11/16/2023)    Overall Financial Resource Strain (CARDIA)     Difficulty of Paying Living Expenses: Patient declined   Food Insecurity: Not on file

## 2024-02-06 LAB
., LABCORP: NORMAL
CYTOLOGIST CVX/VAG CYTO: NORMAL
CYTOLOGY CVX/VAG DOC CYTO: NORMAL
CYTOLOGY CVX/VAG DOC THIN PREP: NORMAL
DX ICD CODE: NORMAL
Lab: NORMAL
OTHER STN SPEC: NORMAL
STAT OF ADQ CVX/VAG CYTO-IMP: NORMAL

## 2024-02-14 ENCOUNTER — TELEMEDICINE (OUTPATIENT)
Dept: PRIMARY CARE CLINIC | Facility: CLINIC | Age: 44
End: 2024-02-14
Payer: COMMERCIAL

## 2024-02-14 ENCOUNTER — HOSPITAL ENCOUNTER (OUTPATIENT)
Facility: HOSPITAL | Age: 44
Discharge: HOME | End: 2024-02-17
Attending: FAMILY MEDICINE
Payer: COMMERCIAL

## 2024-02-14 ENCOUNTER — NURSE ONLY (OUTPATIENT)
Age: 44
End: 2024-02-14

## 2024-02-14 DIAGNOSIS — F41.1 GENERALIZED ANXIETY DISORDER: Primary | ICD-10-CM

## 2024-02-14 DIAGNOSIS — R92.8 ABNORMAL MAMMOGRAM OF RIGHT BREAST: ICD-10-CM

## 2024-02-14 DIAGNOSIS — N92.6 IRREGULAR MENSES: Primary | ICD-10-CM

## 2024-02-14 PROCEDURE — 76642 ULTRASOUND BREAST LIMITED: CPT

## 2024-02-14 PROCEDURE — G0279 TOMOSYNTHESIS, MAMMO: HCPCS

## 2024-02-14 PROCEDURE — 99213 OFFICE O/P EST LOW 20 MIN: CPT | Performed by: FAMILY MEDICINE

## 2024-02-14 RX ORDER — ESCITALOPRAM OXALATE 20 MG/1
20 TABLET ORAL DAILY
Qty: 90 TABLET | Refills: 1 | Status: SHIPPED | OUTPATIENT
Start: 2024-02-14

## 2024-02-14 RX ORDER — ESCITALOPRAM OXALATE 20 MG/1
20 TABLET ORAL DAILY
Qty: 90 TABLET | Refills: 1 | Status: SHIPPED | OUTPATIENT
Start: 2024-02-14 | End: 2024-02-14 | Stop reason: SDUPTHER

## 2024-02-14 NOTE — PROGRESS NOTES
discoloration noted on facial skin         [] Abnormal -            Psychiatric:       [x] Normal Affect [] Abnormal -        [x] No Hallucinations        --Anahi Vasquez DO

## 2024-02-15 LAB
DHEA-S SERPL-MCNC: 77.5 UG/DL (ref 57.3–279.2)
HBA1C MFR BLD: 5.3 % (ref 4.8–5.6)
PROLACTIN SERPL-MCNC: 10.9 NG/ML (ref 4.8–33.4)
T4 FREE SERPL-MCNC: 1.23 NG/DL (ref 0.82–1.77)

## 2024-02-16 LAB — TSH SERPL DL<=0.005 MIU/L-ACNC: 0.87 UIU/ML (ref 0.45–4.5)

## 2024-02-23 LAB
TESTOST FREE SERPL-MCNC: 0.8 PG/ML (ref 0–4.2)
TESTOST SERPL-MCNC: 23.5 NG/DL

## 2024-02-26 ENCOUNTER — TELEPHONE (OUTPATIENT)
Dept: PRIMARY CARE CLINIC | Facility: CLINIC | Age: 44
End: 2024-02-26

## 2024-02-26 NOTE — TELEPHONE ENCOUNTER
Spoke with patient. She described a fall that happened 6wks ago.  She has not had it examined however her brother is a physician advised her that it may be broken. Since the pain is not going away she would like to be seen.   Appointment scheduled for 3/5/24 @ 3064

## 2025-02-18 SDOH — HEALTH STABILITY: PHYSICAL HEALTH: ON AVERAGE, HOW MANY DAYS PER WEEK DO YOU ENGAGE IN MODERATE TO STRENUOUS EXERCISE (LIKE A BRISK WALK)?: 2 DAYS

## 2025-02-18 SDOH — HEALTH STABILITY: PHYSICAL HEALTH: ON AVERAGE, HOW MANY MINUTES DO YOU ENGAGE IN EXERCISE AT THIS LEVEL?: 60 MIN

## 2025-02-19 ENCOUNTER — TELEMEDICINE (OUTPATIENT)
Facility: CLINIC | Age: 45
End: 2025-02-19

## 2025-02-19 DIAGNOSIS — F41.9 ANXIETY: Primary | ICD-10-CM

## 2025-02-19 DIAGNOSIS — Z13.220 LIPID SCREENING: ICD-10-CM

## 2025-02-19 RX ORDER — ESCITALOPRAM OXALATE 20 MG/1
20 TABLET ORAL DAILY
Qty: 90 TABLET | Refills: 1 | Status: SHIPPED | OUTPATIENT
Start: 2025-02-19

## 2025-02-19 ASSESSMENT — ENCOUNTER SYMPTOMS: WHEEZING: 0

## 2025-02-19 NOTE — ASSESSMENT & PLAN NOTE
Chronic, at goal (stable), continue current treatment plan.  Continue on 10mg for now; if desiring to taper further can discuss recommended approach.      Orders:    CBC with Auto Differential; Future    Comprehensive Metabolic Panel; Future    Lipid Panel; Future

## 2025-02-19 NOTE — PROGRESS NOTES
Elva Weiss, was evaluated through a synchronous (real-time) audio-video encounter. The patient (or guardian if applicable) is aware that this is a billable service, which includes applicable co-pays. This Virtual Visit was conducted with patient's (and/or legal guardian's) consent. Patient identification was verified, and a caregiver was present when appropriate.   The patient was located at Home: [address] [facility] [address]  Provider was located at Facility (Appt Dept): [address] [place] [id number]  Confirm you are appropriately licensed, registered, or certified to deliver care in the state where the patient is located as indicated above. If you are not or unsure, please re-schedule the visit: Yes, I confirm.     Elva Weiss (:  1980) is a New patient, presenting virtually for evaluation of the following:      Below is the assessment and plan developed based on review of pertinent history, physical exam, labs, studies, and medications.     Assessment & Plan  Anxiety   Chronic, at goal (stable), continue current treatment plan.  Continue on 10mg for now; if desiring to taper further can discuss recommended approach.      Orders:    CBC with Auto Differential; Future    Comprehensive Metabolic Panel; Future    Lipid Panel; Future    Lipid screening    Labs when fasting.   Follow up yearly for physicals, continue with Gyn for well woman exams.    Orders:    Lipid Panel; Future      No follow-ups on file.       Subjective   Establish care - physically states she is doing well.  Takes only escitalopram, taking 1/2 tablet daily and hoping to wean completely in the future.  Has been on anxiety meds since her 20s, does feel she doesn't need it quite as much.      Very active, plays soccer, does pilates.  Overall doing very well.        Review of Systems   Constitutional:  Negative for activity change.   Respiratory:  Negative for wheezing.    Cardiovascular:

## 2025-02-20 NOTE — PROGRESS NOTES
Elva Weiss is a 44 y.o. female returns for an annual exam     Chief Complaint   Patient presents with    Annual Exam       Problems: The patient is complaining of urinary leaking. Patient states occasionally has to wear a pad.      OB/GYN History   -  x 2  Hx of STI - No  SA - Yes, Male      Patient's last menstrual period was 2025.  Menses: Regular every month without spotting, flow is heavy, and usually lasting 2 to 4 days  Contraception: Vasectomy        Health Maintenance  Last Pap:  24,NILM    Last Mammogram:  24 BiRads 2 - Jerica 17.22%  Last Colonoscopy: Never      1. Have you been to the ER, urgent care clinic, or hospitalized since your last visit? No  2. Have you seen or consulted any other health care providers outside of the [facility] since your last visit? No      She declines a chaperone during the gynecologic exam today.

## 2025-02-21 ENCOUNTER — OFFICE VISIT (OUTPATIENT)
Age: 45
End: 2025-02-21
Payer: COMMERCIAL

## 2025-02-21 VITALS
HEART RATE: 68 BPM | RESPIRATION RATE: 17 BRPM | TEMPERATURE: 98.6 F | OXYGEN SATURATION: 98 % | HEIGHT: 71 IN | DIASTOLIC BLOOD PRESSURE: 71 MMHG | WEIGHT: 175 LBS | SYSTOLIC BLOOD PRESSURE: 108 MMHG | BODY MASS INDEX: 24.5 KG/M2

## 2025-02-21 DIAGNOSIS — Z12.31 BREAST CANCER SCREENING BY MAMMOGRAM: ICD-10-CM

## 2025-02-21 DIAGNOSIS — N39.46 MIXED STRESS AND URGE URINARY INCONTINENCE: ICD-10-CM

## 2025-02-21 DIAGNOSIS — Z01.419 ENCOUNTER FOR GYNECOLOGICAL EXAMINATION: Primary | ICD-10-CM

## 2025-02-21 PROCEDURE — 99396 PREV VISIT EST AGE 40-64: CPT | Performed by: OBSTETRICS & GYNECOLOGY

## 2025-02-21 NOTE — PROGRESS NOTES
Annual Exam    Chief Complaint   Patient presents with    Annual Exam       Elva Weiss is a 44 y.o.  presenting for annual exam. Her main concerns today include well woman exam. She does note some urinary leakage. She has previously done pelvic floor physical therapy many years ago. She reports urine leakage when playing sports, coughing/sneezing. She also reports urine leakage when walking in the door after picking her kids up from school. She particularly notes that 10 am and 3 pm are difficult times for her. She notes increased odor as the day goes on that is new- she believes it is from urine. She reports that she has had an episode of stan incontinence while playing soccer.     Her last pap test was in 2024- NILM. She reports no history of abnormals.  Her last mammogram was in 2024: BiRads 2. She did need to complete additional views due to original mammogram reading as BiRads 0. She is scheduled for a repeat mammogram next month (3/6/2025).  She has not previously had a colonoscopy.    She is sexually active with a male partner. She uses vasectomy for contraception. No history of STIs.    She has irregular menstrual periods. She notes that periods are heavy but only last 2-4 days. She notes that periods continue to get lighter.     She has an 11 year old son and a 9 year old daughter!    OB History    Para Term  AB Living   3 2 2   1 2   SAB IAB Ectopic Molar Multiple Live Births   1         2      # Outcome Date GA Lbr Arnoldo/2nd Weight Sex Type Anes PTL Lv   3 Term 07/11/15 40w5d 09:37 / 00:54 3.7 kg (8 lb 2.5 oz) F   N KYLER   2 Term 13 41w0d 20:45 / 00:35 3.665 kg (8 lb 1.3 oz) M   N KYLER   1 SAB       None        Birth Comments: System Generated. Please review and update pregnancy details.       Past Medical History:   Diagnosis Date    Anxiety     Encounter for IUD removal     Psychiatric problem     anixety       Past Surgical History:   Procedure Laterality Date

## 2025-03-03 LAB
CYTOLOGIST CVX/VAG CYTO: ABNORMAL
CYTOLOGY CVX/VAG DOC CYTO: ABNORMAL
CYTOLOGY CVX/VAG DOC THIN PREP: ABNORMAL
DX ICD CODE: ABNORMAL
DX ICD CODE: ABNORMAL
HPV GENOTYPE REFLEX: ABNORMAL
HPV I/H RISK 4 DNA CVX QL PROBE+SIG AMP: NEGATIVE
OTHER STN SPEC: ABNORMAL
PATHOLOGIST CVX/VAG CYTO: ABNORMAL
SERVICE CMNT-IMP: ABNORMAL
STAT OF ADQ CVX/VAG CYTO-IMP: ABNORMAL

## 2025-03-06 ENCOUNTER — HOSPITAL ENCOUNTER (OUTPATIENT)
Age: 45
Discharge: HOME | End: 2025-03-09
Payer: COMMERCIAL

## 2025-03-06 VITALS — BODY MASS INDEX: 29.88 KG/M2 | WEIGHT: 175 LBS | HEIGHT: 64 IN

## 2025-03-06 DIAGNOSIS — Z12.31 BREAST CANCER SCREENING BY MAMMOGRAM: ICD-10-CM

## 2025-03-06 PROCEDURE — 77063 BREAST TOMOSYNTHESIS BI: CPT

## 2025-03-12 ENCOUNTER — TELEPHONE (OUTPATIENT)
Age: 45
End: 2025-03-12

## 2025-03-12 DIAGNOSIS — Z12.31 BREAST CANCER SCREENING BY MAMMOGRAM: Primary | ICD-10-CM

## 2025-03-12 NOTE — TELEPHONE ENCOUNTER
Received a call from central Scheduling as the pt's last Mammogram recommended a follow up breast MRI.    Dr. Mitchlel read the results and told the pt she would order the MRI if she desired. Pt was unable to respond through [facility] and would the MRI ordered. I have pended the order for review and sign off.

## 2025-05-13 ENCOUNTER — OFFICE VISIT (OUTPATIENT)
Facility: CLINIC | Age: 45
End: 2025-05-13
Payer: COMMERCIAL

## 2025-05-13 VITALS
SYSTOLIC BLOOD PRESSURE: 113 MMHG | RESPIRATION RATE: 16 BRPM | HEART RATE: 95 BPM | TEMPERATURE: 99.2 F | DIASTOLIC BLOOD PRESSURE: 74 MMHG | HEIGHT: 64 IN | WEIGHT: 172.4 LBS | OXYGEN SATURATION: 98 % | BODY MASS INDEX: 29.43 KG/M2

## 2025-05-13 DIAGNOSIS — R20.2 PARESTHESIA OF BILATERAL LEGS: ICD-10-CM

## 2025-05-13 DIAGNOSIS — M79.604 PAIN IN BOTH LOWER EXTREMITIES: ICD-10-CM

## 2025-05-13 DIAGNOSIS — L03.115 CELLULITIS OF RIGHT LOWER EXTREMITY: ICD-10-CM

## 2025-05-13 DIAGNOSIS — J02.0 STREP SORE THROAT: Primary | ICD-10-CM

## 2025-05-13 DIAGNOSIS — M79.605 PAIN IN BOTH LOWER EXTREMITIES: ICD-10-CM

## 2025-05-13 PROCEDURE — 99214 OFFICE O/P EST MOD 30 MIN: CPT | Performed by: FAMILY MEDICINE

## 2025-05-13 RX ORDER — AMOXICILLIN 500 MG/1
500 CAPSULE ORAL 2 TIMES DAILY
Qty: 20 CAPSULE | Refills: 0 | Status: SHIPPED | OUTPATIENT
Start: 2025-05-13 | End: 2025-05-23

## 2025-05-13 SDOH — ECONOMIC STABILITY: FOOD INSECURITY: WITHIN THE PAST 12 MONTHS, YOU WORRIED THAT YOUR FOOD WOULD RUN OUT BEFORE YOU GOT MONEY TO BUY MORE.: NEVER TRUE

## 2025-05-13 SDOH — ECONOMIC STABILITY: FOOD INSECURITY: WITHIN THE PAST 12 MONTHS, THE FOOD YOU BOUGHT JUST DIDN'T LAST AND YOU DIDN'T HAVE MONEY TO GET MORE.: NEVER TRUE

## 2025-05-13 ASSESSMENT — PATIENT HEALTH QUESTIONNAIRE - PHQ9
SUM OF ALL RESPONSES TO PHQ QUESTIONS 1-9: 0
2. FEELING DOWN, DEPRESSED OR HOPELESS: NOT AT ALL
SUM OF ALL RESPONSES TO PHQ QUESTIONS 1-9: 0
1. LITTLE INTEREST OR PLEASURE IN DOING THINGS: NOT AT ALL

## 2025-05-13 NOTE — PROGRESS NOTES
Chief Complaint   Patient presents with    Other     Pt states that she may have strep throat     Insect Bite     Pt states that she has a spider bite on her right knee states that it is not healing     Leg Pain     Pt states that she also has leg pain in both legs     Have you been to the ER, urgent care clinic since your last visit?  Hospitalized since your last visit?   NO    Have you seen or consulted any other health care providers outside our system since your last visit?   NO

## 2025-05-13 NOTE — PROGRESS NOTES
Elva Weiss (:  1980) is a 44 y.o. female, Established patient, here for evaluation of the following chief complaint(s):  Other (Pt states that she may have strep throat ), Insect Bite (Pt states that she has a spider bite on her right knee states that it is not healing ), and Leg Pain (Pt states that she also has leg pain in both legs)      Subjective   History of Present Illness  The patient presents for evaluation of a spider bite, sore throat, and bilateral leg pain.    She reports a potential spider bite that occurred during the night approximately 2 weeks ago. The bite site was initially characterized by a clear entry point and discoloration, resembling a bruise. Despite attempts to keep the area dry and uncovered, the wound has not healed. She has not applied any antibiotic ointment to the area. Recently, keeping a Band-Aid on the wound for 12 hours has shown improvement.    She also reports experiencing a sore throat upon awakening this morning, with known exposure to son who has strep, but is on day 7 of abx's. She does not experience any ear pain, cough, or runny nose. She has not taken any medication today and has not had a higher temperature than the current reading of 99.2°F. She retains her tonsils and does not have a history of frequent sinus infections.    She describes experiencing bone pain extending from her hips to her feet, which is sometimes severe. The pain is bilateral and is particularly intense in the center of her legs. The pain is more pronounced in the evenings and has been present for a couple of months, occurring randomly. She also reports feeling soreness in her quadriceps, similar to muscle pain, but does not experience any cramps. She is uncertain if the pain correlates with her menstrual cycle. She is considering physical therapy for knee pain that occurs when she plays soccer.     She is up to date with Pap smears. She had blood work done within the last 12

## 2025-07-10 DIAGNOSIS — K62.5 BRBPR (BRIGHT RED BLOOD PER RECTUM): Primary | ICD-10-CM
